# Patient Record
Sex: MALE | Race: WHITE | NOT HISPANIC OR LATINO | Employment: UNEMPLOYED | URBAN - METROPOLITAN AREA
[De-identification: names, ages, dates, MRNs, and addresses within clinical notes are randomized per-mention and may not be internally consistent; named-entity substitution may affect disease eponyms.]

---

## 2019-09-27 ENCOUNTER — TRANSFERRED RECORDS (OUTPATIENT)
Dept: HEALTH INFORMATION MANAGEMENT | Facility: CLINIC | Age: 44
End: 2019-09-27

## 2019-10-25 ENCOUNTER — TELEPHONE (OUTPATIENT)
Dept: FAMILY MEDICINE | Facility: CLINIC | Age: 44
End: 2019-10-25

## 2019-10-25 ENCOUNTER — OFFICE VISIT (OUTPATIENT)
Dept: FAMILY MEDICINE | Facility: CLINIC | Age: 44
End: 2019-10-25
Payer: MEDICAID

## 2019-10-25 ENCOUNTER — ANCILLARY PROCEDURE (OUTPATIENT)
Dept: GENERAL RADIOLOGY | Facility: CLINIC | Age: 44
End: 2019-10-25
Attending: FAMILY MEDICINE
Payer: MEDICAID

## 2019-10-25 VITALS
WEIGHT: 157.3 LBS | RESPIRATION RATE: 16 BRPM | SYSTOLIC BLOOD PRESSURE: 120 MMHG | TEMPERATURE: 99.2 F | HEART RATE: 78 BPM | OXYGEN SATURATION: 97 % | DIASTOLIC BLOOD PRESSURE: 80 MMHG | BODY MASS INDEX: 24.69 KG/M2 | HEIGHT: 67 IN

## 2019-10-25 DIAGNOSIS — M43.16 SPONDYLOLISTHESIS OF LUMBAR REGION: Primary | ICD-10-CM

## 2019-10-25 DIAGNOSIS — M43.16 SPONDYLOLISTHESIS OF LUMBAR REGION: ICD-10-CM

## 2019-10-25 PROCEDURE — 36415 COLL VENOUS BLD VENIPUNCTURE: CPT | Performed by: FAMILY MEDICINE

## 2019-10-25 PROCEDURE — 72100 X-RAY EXAM L-S SPINE 2/3 VWS: CPT

## 2019-10-25 PROCEDURE — 99203 OFFICE O/P NEW LOW 30 MIN: CPT | Performed by: FAMILY MEDICINE

## 2019-10-25 PROCEDURE — 80048 BASIC METABOLIC PNL TOTAL CA: CPT | Performed by: FAMILY MEDICINE

## 2019-10-25 RX ORDER — IBUPROFEN 200 MG
400 TABLET ORAL
COMMUNITY
End: 2019-10-28 | Stop reason: ALTCHOICE

## 2019-10-25 RX ORDER — NAPROXEN 500 MG/1
500 TABLET ORAL 2 TIMES DAILY WITH MEALS
Qty: 60 TABLET | Refills: 0 | Status: SHIPPED | OUTPATIENT
Start: 2019-10-25 | End: 2019-12-06

## 2019-10-25 ASSESSMENT — ENCOUNTER SYMPTOMS
BACK PAIN: 1
DIFFICULTY URINATING: 0
JOINT SWELLING: 0
UNEXPECTED WEIGHT CHANGE: 0
NUMBNESS: 1
FEVER: 0

## 2019-10-25 ASSESSMENT — MIFFLIN-ST. JEOR: SCORE: 1561.1

## 2019-10-25 NOTE — TELEPHONE ENCOUNTER
Form was received today during office visit with Dr. Joseph. Form is placed in Dr. Joseph's folder at his station to review and sign. Once form is completed please fax to [phone].  Day Mcintyre

## 2019-10-25 NOTE — LETTER
October 28, 2019      Luis Eduardo Echevarria  [address] [place] [address]        Dear ,    We are writing to inform you of your test results.  Your test results fall within the expected range(s) or remain unchanged from previous results.  Please continue with current treatment plan.  Resulted Orders   Basic metabolic panel  (Ca, Cl, CO2, Creat, Gluc, K, Na, BUN)   Result Value Ref Range    Sodium 138 133 - 144 mmol/L    Potassium 4.3 3.4 - 5.3 mmol/L    Chloride 106 94 - 109 mmol/L    Carbon Dioxide 27 20 - 32 mmol/L    Anion Gap 5 3 - 14 mmol/L    Glucose 80 70 - 99 mg/dL    Urea Nitrogen 11 7 - 30 mg/dL    Creatinine 0.90 0.66 - 1.25 mg/dL    GFR Estimate >90 >60 mL/min/[1.73_m2]      Comment:      Non  GFR Calc  Starting 12/18/2018, serum creatinine based estimated GFR (eGFR) will be   calculated using the Chronic Kidney Disease Epidemiology Collaboration   (CKD-EPI) equation.      GFR Estimate If Black >90 >60 mL/min/[1.73_m2]      Comment:       GFR Calc  Starting 12/18/2018, serum creatinine based estimated GFR (eGFR) will be   calculated using the Chronic Kidney Disease Epidemiology Collaboration   (CKD-EPI) equation.      Calcium 8.5 8.5 - 10.1 mg/dL   If you have any questions or concerns, please call the clinic at the number listed above.     Sincerely,    Nadeem Joseph MD

## 2019-10-25 NOTE — PROGRESS NOTES
"Subjective     Luis Eduardo Echevarria is a 44 year old male who presents to clinic today for the following health issues:    HPI   44-year-old male presents to clinic to establish care and for follow-up of low back pain.  Initially seen at a [facility] where he had subsequent MRI testing done of his lumbar spine.  Reports he has had chronic back pain for years however has been worse in the last few months.  Pain is sharp, stabbing and shoots down his left leg.  Aggravated by movement and position with no relieving factors.  Refractory to over-the-counter ibuprofen.  Has not had physical therapy.  No incontinence of bowel or bladder. No groin numbness.  Ambulates independently without assistance.  No known injury.  No longer able to work.  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  No additional concerns today.    Reviewed and updated as needed this visit by Provider         Review of Systems   Constitutional: Negative for fever and unexpected weight change.   Genitourinary: Negative for difficulty urinating.   Musculoskeletal: Positive for back pain. Negative for joint swelling.   Skin: Negative for rash.   Neurological: Positive for numbness.     Past Medical History:   Diagnosis Date     Chronic low back pain        Past Surgical History:   Procedure Laterality Date     no surgeries         Family History   Problem Relation Age of Onset     No Known Problems Mother      No Known Problems Father        Social History     Tobacco Use     Smoking status: Current Every Day Smoker     Packs/day: 0.00     Smokeless tobacco: Former User   Substance Use Topics     Alcohol use: Yes            Objective    /80 (BP Location: Right arm, Patient Position: Chair, Cuff Size: Adult Regular)   Pulse 78   Temp 99.2  F (37.3  C) (Oral)   Resp 16   Ht 1.708 m (5' 7.25\")   Wt 71.4 kg (157 lb 4.8 oz)   SpO2 97%   BMI 24.45 kg/m    Body mass index is 24.45 kg/m .  Physical Exam  Neurological:      Gait: Gait " (Normal ambulation, able to ambulate on just forefoot and just heel.) normal.      Deep Tendon Reflexes: Reflexes normal.      Comments: No reported tenderness with straight leg raise.  Forward flexion normal, right ordered and left leg rotation normal.  Decreased range of motion with back bend.          [facility] [id number] - 09/27/2019 9:23 AM CDT    EXAM: MR LUMBAR SPINE WITHOUT IV CONTRAST    COMPARISON: None    FINDINGS:     T12-L1: There is no disc herniation, any significant spinal stenosis or neural  foraminal stenosis.  L1-2: There is no disc herniation, any significant spinal stenosis or neural  foraminal stenosis.  L2-3: There is no disc herniation, any significant spinal stenosis or neural  foraminal stenosis.  L3-4: There is no disc herniation, any significant spinal stenosis or neural  foraminal stenosis.  L4-5: There is central disc bulging producing impression upon the anterior  thecal sac, but no significant spinal stenosis or neural foraminal stenosis.  L5-S1: There is a grade 1 anterolisthesis of L5 on S1, which combines with  diffuse posterior disc bulging, hypertrophic degenerative changes of the facet  joints and mild hypertrophy ligamentum flavum to produce mild spinal stenosis  and bilateral neural foraminal stenosis.    Alignment: Again, grade 1 anterolisthesis of L5 on S1.  Bone Marrow: Normal  Conus: Normal termination  Extra-spinal Findings: No significant incidental findings    For the purpose of this report, 5 lumbar type vertebral bodies are assumed.   Close radiographic correlation recommended prior to any spinal intervention or  surgery.    IMPRESSION:    1. Grade 1 anterolisthesis of L5 on S1, which combines with hypertrophy of the  ligamentum flavum and hypertrophic degenerative changes of the facet joints to  produce mild spinal stenosis and bilateral neural foraminal stenosis.        LUMBAR SPINE TWO - THREE VIEWS   10/25/2019 2:16 PM      HISTORY:  Spondylolisthesis of lumbar region.     COMPARISON: None.     FINDINGS: Alignment is significant for trace dextroconvex curvature of  the thoracolumbar junction and trace levoconvex curvature of the lower  lumbar spine. In addition, there is grade 3 anterolisthesis of L5 on  S1 measuring approximately 2.1 cm. There is associated severe disc  height loss. Pars defects are suspected at L5, but not discretely  visualized.                                                                      IMPRESSION: Grade 3 anterolisthesis of L5 on S1. Recommend spine  surgery consult.        Assessment & Plan     1. Spondylolisthesis of lumbar region  New problem.  Had MRI of lumbar spine performed at an outside facility 1 month ago that showed grade 1 anterolisthesis on L5 and S1.  Repeat lumbar x-ray today which showed worsening to grade 3 anterolisthesis.  Patient is symptomatic however has a nonfocal neurological exam and is ambulating without assistance.  Will start pain control with naproxen and start physical therapy.  Check BMP to ensure kidney function is normal.  Close interval follow-up in 2 weeks.  Advised patient to call and follow-up if symptoms progressively worsens and we will refer him to a spine surgeon for lumbar fusion.  - XR Lumbar Spine 2/3 Views; Future  - naproxen (NAPROSYN) 500 MG tablet; Take 1 tablet (500 mg) by mouth 2 times daily (with meals)  Dispense: 60 tablet; Refill: 0  - Basic metabolic panel  (Ca, Cl, CO2, Creat, Gluc, K, Na, BUN)  - [facility] REFERRAL; Future       Return in about 2 weeks (around 11/8/2019) for Physical Exam.    Nadeem Joseph MD  [facility]    Documentation was prepared using Dragon voice recognition software. Please excuse typographical errors. Please contact me if documentation is unclear.

## 2019-10-26 LAB
ANION GAP SERPL CALCULATED.3IONS-SCNC: 5 MMOL/L (ref 3–14)
BUN SERPL-MCNC: 11 MG/DL (ref 7–30)
CALCIUM SERPL-MCNC: 8.5 MG/DL (ref 8.5–10.1)
CHLORIDE SERPL-SCNC: 106 MMOL/L (ref 94–109)
CO2 SERPL-SCNC: 27 MMOL/L (ref 20–32)
CREAT SERPL-MCNC: 0.9 MG/DL (ref 0.66–1.25)
GFR SERPL CREATININE-BSD FRML MDRD: >90 ML/MIN/{1.73_M2}
GLUCOSE SERPL-MCNC: 80 MG/DL (ref 70–99)
POTASSIUM SERPL-SCNC: 4.3 MMOL/L (ref 3.4–5.3)
SODIUM SERPL-SCNC: 138 MMOL/L (ref 133–144)

## 2019-10-28 ENCOUNTER — TELEPHONE (OUTPATIENT)
Dept: FAMILY MEDICINE | Facility: CLINIC | Age: 44
End: 2019-10-28

## 2019-10-28 NOTE — TELEPHONE ENCOUNTER
Called to inform Xray Lumbar result, no answer, msg left to call me back.     Called again.  Patient states that he has not started naproxen and had to leave due to a family emergency.  His former wife sister is dying.  Still ambulating without difficulty however still having significant back pain.  Voiced great appreciation after informing him that the x-ray lumbar results showed that his final listhesis has worsened.  Agrees and follows up he agrees to follow-up with me in 2 weeks at his physical exam to report his symptom control and voiced understanding that we may send him to a spine surgeon.  Otherwise, he has no new concerns.

## 2019-10-28 NOTE — TELEPHONE ENCOUNTER
Form completed and faxed to [phone]. Sent a copy to [facility] and sent for to [facility]. Day Mcintyre

## 2019-11-07 ENCOUNTER — THERAPY VISIT (OUTPATIENT)
Dept: PHYSICAL THERAPY | Facility: CLINIC | Age: 44
End: 2019-11-07
Attending: FAMILY MEDICINE
Payer: COMMERCIAL

## 2019-11-07 DIAGNOSIS — M54.50 LOW BACK PAIN: ICD-10-CM

## 2019-11-07 DIAGNOSIS — M43.16 SPONDYLOLISTHESIS OF LUMBAR REGION: ICD-10-CM

## 2019-11-07 PROCEDURE — 97110 THERAPEUTIC EXERCISES: CPT | Mod: GP | Performed by: PHYSICAL THERAPIST

## 2019-11-07 PROCEDURE — 97161 PT EVAL LOW COMPLEX 20 MIN: CPT | Mod: GP | Performed by: PHYSICAL THERAPIST

## 2019-11-07 NOTE — PROGRESS NOTES
"[facility] Initial Evaluation  Subjective:  The history is provided by the patient. No  was used.   Type of problem:  Lumbar   Condition occurred with:  Insidious onset.    Problem details: Patient reports having back pain for \"years\".  Patient awoke on 8/19/2019 with severe left low back pain radiating down into the left foot along with numbness/tingling.  Patient c/o pain with lifting, sitting, and standing.  Patient more comfortable squatting.  Physical therapy was ordered 10/25/2019.  Patient born with mild spina bifida.   MRI indicates grade 1 anterolisthesis.  X-Ray indicates grade 3 anterolisthesis.  Patient apprehensive about physical therapy.   Patient reports pain:  Central lumbar spine and lumbar spine left. Radiates to:  Gluteals left, thigh left, lower leg left and foot left. Associated symptoms:  Numbness, tingling and loss of motion/stiffness. Symptoms are exacerbated by sitting, standing, walking and lifting and relieved by NSAID's and other (squatting).    Where condition occurred: for unknown reasons.  and reported as 3/10 on pain scale. General health as reported by patient is good. Pertinent medical history includes:  Smoking.  Medical allergies: none.  Surgeries include:  None.  Current medications:  Anti-inflammatory.     Pain quality: \"nerve pain\" and is constant. Pain is worse in the A.M.. Since onset symptoms are gradually improving. Special tests:  MRI and x-ray. Past treatment: none.    Patient is . Restrictions include:  Currently not working due to present treatment.    Barriers include:  None as reported by patient.  Red flags:  None as reported by patient.                      Objective:  Standing Alignment:        Lumbar:  Lordosis decr            Gait:      Deviations:  Lumbar:  Trunk flexionGeneral Deviations:  Katlin decr               Lumbar/SI Evaluation  ROM:    AROM Lumbar:   Flexion:          WNL  Ext:                    Major " Loss - Severe apprehension   Side Bend:        Left:     Right:   Rotation:           Left:     Right:   Side Glide:        Left:  Mod Loss    Right:  Mod Loss        Strength: Fair core strength  Lumbar Myotomes:  normal                  Neural Tension/Mobility:      Left side:SLR  negative.     Right side:   SLR  negative.                                                        General     ROS    Assessment/Plan:    Patient is a 44 year old male with lumbar complaints.    Patient has the following significant findings with corresponding treatment plan.                Diagnosis 1:  Acute on Chronic LBP - Anterolisthesis  Pain -  self management, education and home program  Decreased ROM/flexibility - therapeutic exercise, therapeutic activity and home program  Decreased strength - therapeutic exercise, therapeutic activities and home program  Impaired gait - gait training and home program  Impaired muscle performance - neuro re-education and home program  Decreased function - therapeutic activities and home program  Impaired posture - neuro re-education, therapeutic activities and home program    Therapy Evaluation Codes:   1) History comprised of:   Personal factors that impact the plan of care:      None.    Comorbidity factors that impact the plan of care are:      None.     Medications impacting care: Anti-inflammatory.  2) Examination of Body Systems comprised of:   Body structures and functions that impact the plan of care:      Lumbar spine.   Activity limitations that impact the plan of care are:      Sitting, Standing, Walking and Working.  3) Clinical presentation characteristics are:   Stable/Uncomplicated.  4) Decision-Making    Low complexity using standardized patient assessment instrument and/or measureable assessment of functional outcome.  Cumulative Therapy Evaluation is: Low complexity.    Previous and current functional limitations:  (See Goal Flow Sheet for this information)    Short term and  Long term goals: (See Goal Flow Sheet for this information)     Communication ability:  Patient appears to be able to clearly communicate and understand verbal and written communication and follow directions correctly.  Treatment Explanation - The following has been discussed with the patient:   RX ordered/plan of care  Anticipated outcomes  Possible risks and side effects  This patient would benefit from PT intervention to resume normal activities.   Rehab potential is good.    Frequency:  1 X week, once daily  Duration:  for 8 weeks  Discharge Plan:  Achieve all LTG.  Independent in home treatment program.  Reach maximal therapeutic benefit.    Please refer to the daily flowsheet for treatment today, total treatment time and time spent performing 1:1 timed codes.

## 2019-11-08 ENCOUNTER — OFFICE VISIT (OUTPATIENT)
Dept: FAMILY MEDICINE | Facility: CLINIC | Age: 44
End: 2019-11-08
Payer: COMMERCIAL

## 2019-11-08 VITALS
SYSTOLIC BLOOD PRESSURE: 110 MMHG | TEMPERATURE: 98.8 F | WEIGHT: 151.7 LBS | BODY MASS INDEX: 23.81 KG/M2 | HEART RATE: 74 BPM | RESPIRATION RATE: 16 BRPM | DIASTOLIC BLOOD PRESSURE: 80 MMHG | OXYGEN SATURATION: 99 % | HEIGHT: 67 IN

## 2019-11-08 DIAGNOSIS — M43.16 SPONDYLOLISTHESIS OF LUMBAR REGION: Primary | ICD-10-CM

## 2019-11-08 DIAGNOSIS — Z59.9 FINANCIAL DIFFICULTY: ICD-10-CM

## 2019-11-08 PROCEDURE — 99214 OFFICE O/P EST MOD 30 MIN: CPT | Performed by: FAMILY MEDICINE

## 2019-11-08 SDOH — ECONOMIC STABILITY - INCOME SECURITY: PROBLEM RELATED TO HOUSING AND ECONOMIC CIRCUMSTANCES, UNSPECIFIED: Z59.9

## 2019-11-08 ASSESSMENT — ENCOUNTER SYMPTOMS
BACK PAIN: 1
WEAKNESS: 1
DIFFICULTY URINATING: 0
NUMBNESS: 1

## 2019-11-08 ASSESSMENT — MIFFLIN-ST. JEOR: SCORE: 1535.7

## 2019-11-08 NOTE — LETTER
[facility]  [id number] [place] [id number]  Phone: [phone]    11/08/19    Luis Eduardo Echevarria  [address] [facility] [address]      To whom it may concern:     Mr. Echevarria is not able to perform any physical work until further notification due to medical illness. Please call us with any questions.     Sincerely,      Nadeem Joseph MD

## 2019-11-08 NOTE — PROGRESS NOTES
"Subjective     Luis Eduardo Echevarria is a 44 year old male who presents to clinic today for the following health issues:    HPI   Patient is a pleasant 44-year-old male who recently establish care with me who presents for follow-up of low back pain.  Initially seen 2 weeks ago by me and brought in outpatient MRI testing that showed grade 1 L5 on S1 anterolisthesis.  Repeat x-ray showed grade 3.  Time previous visit was referred to at previous visit was referred to physical therapy and started naproxen for pain control.    Continues to have severe low back pain with numbness and tingling radiating down to the left leg.  Had his first physical therapy encounter yesterday.  Naproxen minimally improves pain.  No numbness in the groin, no incontinence of bowel or bladder.     reviewed and updated as needed this visit by Provider         Review of Systems   Genitourinary: Negative for difficulty urinating.   Musculoskeletal: Positive for back pain and gait problem.   Neurological: Positive for weakness and numbness.      Past Medical History:   Diagnosis Date     Spondylolisthesis at L5-S1 level     grade 3           Objective    /80 (BP Location: Right arm, Patient Position: Chair, Cuff Size: Adult Regular)   Pulse 74   Temp 98.8  F (37.1  C) (Oral)   Resp 16   Ht 1.708 m (5' 7.25\")   Wt 68.8 kg (151 lb 11.2 oz)   SpO2 99%   BMI 23.58 kg/m    Body mass index is 23.58 kg/m .  Physical Exam  Vitals signs reviewed.   Constitutional:       Appearance: He is not ill-appearing.   Cardiovascular:      Rate and Rhythm: Normal rate.   Pulmonary:      Effort: No respiratory distress.   Neurological:      Mental Status: He is alert.      Comments: Antalgic gait        10/25/2019  IMPRESSION: Grade 3 anterolisthesis of L5 on S1. Recommend spine  surgery consult.        Assessment & Plan     1. Spondylolisthesis of lumbar region  New problem.  Grade 3 L5 on S1 anterolisthesis.  Continue outpatient physical therapy, consult " spine surgery.  Continue naproxen pain control, would not recommend escalating to narcotics right now as it may be more difficult to control his pain postoperatively.  Follow-up in 1 month for reevaluation.  - SPINE SURGERY REFERRAL    2. Financial difficulty  - CARE COORDINATION REFERRAL      Return in about 1 month (around 12/8/2019) for lumbar back pain pre op.    Nadeem Joseph MD  [facility]    Documentation was prepared using Dragon voice recognition software. Please excuse typographical errors. Please contact me if documentation is unclear.

## 2019-11-11 ENCOUNTER — OFFICE VISIT (OUTPATIENT)
Dept: NEUROSURGERY | Facility: CLINIC | Age: 44
End: 2019-11-11
Attending: FAMILY MEDICINE
Payer: COMMERCIAL

## 2019-11-11 VITALS
SYSTOLIC BLOOD PRESSURE: 139 MMHG | TEMPERATURE: 98.4 F | HEART RATE: 85 BPM | OXYGEN SATURATION: 99 % | DIASTOLIC BLOOD PRESSURE: 82 MMHG | HEIGHT: 67 IN | BODY MASS INDEX: 24.17 KG/M2 | WEIGHT: 154 LBS

## 2019-11-11 DIAGNOSIS — M43.16 SPONDYLOLISTHESIS OF LUMBAR REGION: ICD-10-CM

## 2019-11-11 PROCEDURE — G0463 HOSPITAL OUTPT CLINIC VISIT: HCPCS

## 2019-11-11 PROCEDURE — 99205 OFFICE O/P NEW HI 60 MIN: CPT | Performed by: PHYSICIAN ASSISTANT

## 2019-11-11 ASSESSMENT — PAIN SCALES - GENERAL: PAINLEVEL: MILD PAIN (3)

## 2019-11-11 ASSESSMENT — MIFFLIN-ST. JEOR: SCORE: 1542.17

## 2019-11-11 NOTE — LETTER
"    11/11/2019         RE: Luis Eduardo Echevarria  [address] [facility] [address]        Dear Colleague,    Thank you for referring your patient, Luis Eduardo Echevarria, to the [facility]. Please see a copy of my visit note below.    Neurosurgery consult    Mr. Echevarria is a 44-year-old male referred to us by his primary care provider for evaluation of low back pain and left leg radicular symptoms.  The patient states he has had low back pain for many years and more recently over the past few months has developed left leg pain and tingling.  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  He denies any right leg symptoms he reports he did have a back injury as a young person.  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  Overall the symptoms have been present 6 or 7 years.    He denies bowel or bladder issues or saddle anesthesia denies weakness in his right leg.    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  Non-smoker.      /82 (BP Location: Left arm, Patient Position: Sitting, Cuff Size: Adult Large)   Pulse 85   Temp 98.4  F (36.9  C) (Oral)   Ht 5' 7\" (1.702 m)   Wt 154 lb (69.9 kg)   SpO2 99%   BMI 24.12 kg/m         Exam    Alert and oriented in no acute distress  Bilateral lower extremities with 5 out of 5 strength including dorsiflexion and plantarflexion and extensor hallucis longus  Reflexes 2+ patella and ankle, negative ankle clonus negative Babinski  Positive straight leg raise on the left negative on the right  Lumbar spine nontender to palpation  Gait is normal    Imaging    Lumbar MRI demonstrates grade 1/2 anterolisthesis of L5 on S1 with with bilateral pars defects.  Severe bilateral neuroforaminal stenosis.    Assessment    Low back pain  Left leg " radiculopathy  Anterolisthesis at L5-S1  Spondylolisthesis    Plan:    We will send the patient for trial of lumbar traction and have a home unit issued if appropriate.  He will follow-up in 4 weeks with Dr. Eason, to discuss his symptoms and whether he might want to proceed with lumbar fusion.  Dr. Chand met with the patient today and indicated that this is not a fusion operation that he would perform due to the need for an open approach, therefore we have referred him to Dr. Eason.    Total time was 60 minutes spent in consultation MD Mannie met with the patient and reviewed the above is and is in agreement with the plan    Again, thank you for allowing me to participate in the care of your patient.        Sincerely,        Sanya Mathis PA-C

## 2019-11-11 NOTE — NURSING NOTE
"Luis Eduardo Echevarria is a 44 year old male who presents for:  Chief Complaint   Patient presents with     Consult For     spondylolisthesis of the lumbar region.         Initial Vitals:  /82 (BP Location: Left arm, Patient Position: Sitting, Cuff Size: Adult Large)   Pulse 85   Temp 98.4  F (36.9  C) (Oral)   Ht 5' 7\" (1.702 m)   Wt 154 lb (69.9 kg)   SpO2 99%   BMI 24.12 kg/m   Estimated body mass index is 24.12 kg/m  as calculated from the following:    Height as of this encounter: 5' 7\" (1.702 m).    Weight as of this encounter: 154 lb (69.9 kg).. Body surface area is 1.82 meters squared. BP completed using cuff size: large  Mild Pain (3)        Nursing Comments: Patient states that he has low back pain and left sided leg pain. He also states that he has occasional tingling and numbness in the left foot.         Lashonda Woodruff, [facility]      "

## 2019-11-11 NOTE — PATIENT INSTRUCTIONS
1. Referral for physical therapy.   2. Follow up with Dr. Eason on 12/9/19.       Please call our clinic with any questions or concerns: [phone]

## 2019-11-11 NOTE — PROGRESS NOTES
"Neurosurgery consult    Mr. Echevarria is a 44-year-old male referred to us by his primary care provider for evaluation of low back pain and left leg radicular symptoms.  The patient states he has had low back pain for many years and more recently over the past few months has developed left leg pain and tingling.  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  He denies any right leg symptoms he reports he did have a back injury as a young person.  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  Overall the symptoms have been present 6 or 7 years.    He denies bowel or bladder issues or saddle anesthesia denies weakness in his right leg.    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  Non-smoker.      /82 (BP Location: Left arm, Patient Position: Sitting, Cuff Size: Adult Large)   Pulse 85   Temp 98.4  F (36.9  C) (Oral)   Ht 5' 7\" (1.702 m)   Wt 154 lb (69.9 kg)   SpO2 99%   BMI 24.12 kg/m        Exam    Alert and oriented in no acute distress  Bilateral lower extremities with 5 out of 5 strength including dorsiflexion and plantarflexion and extensor hallucis longus  Reflexes 2+ patella and ankle, negative ankle clonus negative Babinski  Positive straight leg raise on the left negative on the right  Lumbar spine nontender to palpation  Gait is normal    Imaging    Lumbar MRI demonstrates grade 1/2 anterolisthesis of L5 on S1 with with bilateral pars defects.  Severe bilateral neuroforaminal stenosis.    Assessment    Low back pain  Left leg radiculopathy  Anterolisthesis at L5-S1  Spondylolisthesis    Plan:    We will send the patient for trial of lumbar traction and have a home unit issued if appropriate.  He will follow-up in 4 weeks with Dr. Eason, to discuss his symptoms and whether he might want to proceed " with lumbar fusion.  Dr. Chand met with the patient today and indicated that this is not a fusion operation that he would perform due to the need for an open approach, therefore we have referred him to Dr. Eason.    Total time was 60 minutes spent in consultation MD Mannie met with the patient and reviewed the above is and is in agreement with the plan

## 2019-11-12 ENCOUNTER — PATIENT OUTREACH (OUTPATIENT)
Dept: CARE COORDINATION | Facility: CLINIC | Age: 44
End: 2019-11-12

## 2019-11-12 NOTE — PROGRESS NOTES
Clinic Care Coordination Contact  [facility]/Voicemail    Referral Source: PCP  Clinical Data: Care Coordinator Outreach  Outreach attempted x 1.  Left message on patient's voicemail with call back information and requested return call.  Plan: Care Coordinator will send care coordination introduction letter with care coordinator contact information and explanation of care coordination services via mail. Care Coordinator will try to reach patient again in 1-2 business days.      FELY Nesbitt   Care Coordination Team  [phone]

## 2019-11-13 ENCOUNTER — THERAPY VISIT (OUTPATIENT)
Dept: PHYSICAL THERAPY | Facility: CLINIC | Age: 44
End: 2019-11-13
Payer: COMMERCIAL

## 2019-11-13 DIAGNOSIS — M54.50 LOW BACK PAIN: ICD-10-CM

## 2019-11-13 PROCEDURE — 97112 NEUROMUSCULAR REEDUCATION: CPT | Mod: GP | Performed by: PHYSICAL THERAPIST

## 2019-11-13 PROCEDURE — 97110 THERAPEUTIC EXERCISES: CPT | Mod: GP | Performed by: PHYSICAL THERAPIST

## 2019-11-25 ENCOUNTER — THERAPY VISIT (OUTPATIENT)
Dept: PHYSICAL THERAPY | Facility: CLINIC | Age: 44
End: 2019-11-25
Payer: COMMERCIAL

## 2019-11-25 DIAGNOSIS — M54.50 LOW BACK PAIN: ICD-10-CM

## 2019-11-25 PROCEDURE — 97110 THERAPEUTIC EXERCISES: CPT | Mod: GP | Performed by: PHYSICAL THERAPIST

## 2019-11-25 PROCEDURE — 97012 MECHANICAL TRACTION THERAPY: CPT | Mod: GP | Performed by: PHYSICAL THERAPIST

## 2019-12-04 ENCOUNTER — THERAPY VISIT (OUTPATIENT)
Dept: PHYSICAL THERAPY | Facility: CLINIC | Age: 44
End: 2019-12-04
Payer: COMMERCIAL

## 2019-12-04 DIAGNOSIS — M54.50 LOW BACK PAIN: ICD-10-CM

## 2019-12-04 PROCEDURE — 97110 THERAPEUTIC EXERCISES: CPT | Mod: GP | Performed by: PHYSICAL THERAPIST

## 2019-12-04 PROCEDURE — 97012 MECHANICAL TRACTION THERAPY: CPT | Mod: GP | Performed by: PHYSICAL THERAPIST

## 2019-12-06 ENCOUNTER — THERAPY VISIT (OUTPATIENT)
Dept: PHYSICAL THERAPY | Facility: CLINIC | Age: 44
End: 2019-12-06
Payer: COMMERCIAL

## 2019-12-06 ENCOUNTER — OFFICE VISIT (OUTPATIENT)
Dept: FAMILY MEDICINE | Facility: CLINIC | Age: 44
End: 2019-12-06
Payer: COMMERCIAL

## 2019-12-06 VITALS
TEMPERATURE: 98.2 F | DIASTOLIC BLOOD PRESSURE: 86 MMHG | SYSTOLIC BLOOD PRESSURE: 120 MMHG | OXYGEN SATURATION: 99 % | BODY MASS INDEX: 24.34 KG/M2 | HEART RATE: 80 BPM | WEIGHT: 155.1 LBS | RESPIRATION RATE: 16 BRPM | HEIGHT: 67 IN

## 2019-12-06 DIAGNOSIS — M43.16 SPONDYLOLISTHESIS OF LUMBAR REGION: ICD-10-CM

## 2019-12-06 DIAGNOSIS — M54.50 LOW BACK PAIN: ICD-10-CM

## 2019-12-06 DIAGNOSIS — M79.2 NEUROPATHIC PAIN: Primary | ICD-10-CM

## 2019-12-06 PROCEDURE — 97012 MECHANICAL TRACTION THERAPY: CPT | Mod: GP

## 2019-12-06 PROCEDURE — 97110 THERAPEUTIC EXERCISES: CPT | Mod: GP

## 2019-12-06 PROCEDURE — 99213 OFFICE O/P EST LOW 20 MIN: CPT | Performed by: FAMILY MEDICINE

## 2019-12-06 RX ORDER — PREGABALIN 50 MG/1
50 CAPSULE ORAL DAILY
Qty: 30 CAPSULE | Refills: 1 | Status: SHIPPED | OUTPATIENT
Start: 2019-12-06 | End: 2019-12-20

## 2019-12-06 RX ORDER — NAPROXEN 500 MG/1
500 TABLET ORAL 2 TIMES DAILY WITH MEALS
Qty: 60 TABLET | Refills: 1 | Status: ON HOLD | OUTPATIENT
Start: 2019-12-06 | End: 2020-03-01

## 2019-12-06 ASSESSMENT — MIFFLIN-ST. JEOR: SCORE: 1547.16

## 2019-12-06 NOTE — PROGRESS NOTES
"Subjective     Luis Eduardo Echevarria is a 44 year old male who presents to clinic today for the following health issues:    HPI   Patient is a pleasant 44-year-old male who recently establish with me presents to the clinic for follow-up of grade 3 L5 on S1 anterolisthesis.  At last interval visit, we referred him to spine surgery, continues naproxen and physical therapy for pain control.  Patient reports he finds physical therapy helpful, reports 50% reduction in nerve types of pain in low back, which radiates to his left buttocks and thigh.  However, Recently started doing traction therapy at physical therapy, which she has found to be the most helpful.  This was started after recommendation by spinal surgery.  Overall, he is ambulatory independently however continues to have pain with prolonged physical activity including walking.  No new numbness, weakness or tingling.  Denies any numbness in the groin.  No difficulty or incontinence of bowel or bladder.    Reviewed and updated as needed this visit by Provider         Review of Systems   Musculoskeletal: Positive for arthralgias and back pain.            Objective    /86 (BP Location: Right arm, Patient Position: Chair, Cuff Size: Adult Regular)   Pulse 80   Temp 98.2  F (36.8  C) (Oral)   Resp 16   Ht 1.702 m (5' 7\")   Wt 70.4 kg (155 lb 1.6 oz)   SpO2 99%   BMI 24.29 kg/m    Body mass index is 24.29 kg/m .  Physical Exam  Vitals signs reviewed.   Constitutional:       Appearance: Normal appearance. He is not ill-appearing.   Neurological:      Mental Status: He is alert.      Comments: Antalgic gait.  Deep tendon reflexes bilateral patellar reflexes 2+.  Sensation in the lower extremities globally intact.q   Psychiatric:         Mood and Affect: Mood normal.         Last Comprehensive Metabolic Panel:  Sodium   Date Value Ref Range Status   10/25/2019 138 133 - 144 mmol/L Final     Potassium   Date Value Ref Range Status   10/25/2019 4.3 3.4 - 5.3 mmol/L " Final     Chloride   Date Value Ref Range Status   10/25/2019 106 94 - 109 mmol/L Final     Carbon Dioxide   Date Value Ref Range Status   10/25/2019 27 20 - 32 mmol/L Final     Anion Gap   Date Value Ref Range Status   10/25/2019 5 3 - 14 mmol/L Final     Glucose   Date Value Ref Range Status   10/25/2019 80 70 - 99 mg/dL Final     Urea Nitrogen   Date Value Ref Range Status   10/25/2019 11 7 - 30 mg/dL Final     Creatinine   Date Value Ref Range Status   10/25/2019 0.90 0.66 - 1.25 mg/dL Final     GFR Estimate   Date Value Ref Range Status   10/25/2019 >90 >60 mL/min/[1.73_m2] Final     Comment:     Non  GFR Calc  Starting 12/18/2018, serum creatinine based estimated GFR (eGFR) will be   calculated using the Chronic Kidney Disease Epidemiology Collaboration   (CKD-EPI) equation.       Calcium   Date Value Ref Range Status   10/25/2019 8.5 8.5 - 10.1 mg/dL Final             Assessment & Plan     1. Neuropathic pain  Due to problem #2.  Good interval improvement with physical therapy however symptoms are uncontrolled and continue to limit his activities of daily living.  Will start low-dose Lyrica for symptom control.  Explained this is a controlled substance.  Patient voices understanding this prescription is not to be abused, given to others and he should seek his PCP when possible to have this medication refilled.  Patient does have low risk for abuse as he has no previous history of a substance use disorder.  We will follow-up in 2 weeks via a telephone encounter for interval monitoring of neuropathic pain.  - pregabalin (LYRICA) 50 MG capsule; Take 1 capsule (50 mg) by mouth daily  Dispense: 30 capsule; Refill: 1    2. Spondylolisthesis of lumbar region  Moderate control.  Continue physical therapy, continue naproxen.  Initial BMP reviewed without electrolyte derangements.  Consider interval rechecks of every 3 to 6 months of his metabolic panel to ensure there are no renal dysfunctions with  chronic NSAID use.  Medication refilled.  - naproxen (NAPROSYN) 500 MG tablet; Take 1 tablet (500 mg) by mouth 2 times daily (with meals)  Dispense: 60 tablet; Refill: 1       Return in about 2 weeks (around 12/20/2019) for phone visit for back pain followup. .    Nadeem Joseph MD  [facility]      Documentation was prepared using Dragon voice recognition software. Please excuse typographical errors. Please contact me if documentation is unclear.

## 2019-12-07 ASSESSMENT — ENCOUNTER SYMPTOMS
BACK PAIN: 1
ARTHRALGIAS: 1

## 2019-12-09 ENCOUNTER — HEALTH MAINTENANCE LETTER (OUTPATIENT)
Age: 44
End: 2019-12-09

## 2019-12-09 ENCOUNTER — OFFICE VISIT (OUTPATIENT)
Dept: NEUROSURGERY | Facility: CLINIC | Age: 44
End: 2019-12-09
Attending: NEUROLOGICAL SURGERY
Payer: COMMERCIAL

## 2019-12-09 VITALS
BODY MASS INDEX: 24.33 KG/M2 | DIASTOLIC BLOOD PRESSURE: 88 MMHG | HEART RATE: 86 BPM | RESPIRATION RATE: 16 BRPM | OXYGEN SATURATION: 98 % | WEIGHT: 155 LBS | HEIGHT: 67 IN | TEMPERATURE: 98 F | SYSTOLIC BLOOD PRESSURE: 133 MMHG

## 2019-12-09 DIAGNOSIS — M48.061 SPINAL STENOSIS OF LUMBAR REGION WITH RADICULOPATHY: ICD-10-CM

## 2019-12-09 DIAGNOSIS — M43.10 ISTHMIC SPONDYLOLISTHESIS: Primary | ICD-10-CM

## 2019-12-09 DIAGNOSIS — M54.16 SPINAL STENOSIS OF LUMBAR REGION WITH RADICULOPATHY: ICD-10-CM

## 2019-12-09 PROCEDURE — 99213 OFFICE O/P EST LOW 20 MIN: CPT | Performed by: NEUROLOGICAL SURGERY

## 2019-12-09 ASSESSMENT — MIFFLIN-ST. JEOR: SCORE: 1546.71

## 2019-12-09 ASSESSMENT — PAIN SCALES - GENERAL: PAINLEVEL: MILD PAIN (2)

## 2019-12-09 NOTE — PATIENT INSTRUCTIONS
Patient Instructions  Pre-Operative:  -Surgery scheduled at [facility] for Lumbar 4-Sacral 1 posterior segmental instrumentation. Pelvic Instrumentation. Bilateral Lumbar 4-Sacral 1 transforaminal interbody fusion and Leone Muro osteotomies. Posterior arthrodesis Lumbar 4-Sacral 1  -Surgical risks: blood clots in the leg or lung, problems urinating, nerve damage, drainage from the incision, infection,stiffness  - Pre-operative physical with primary care physician within 30 days of surgical date.   -4-7 night hospitalization.    -Shower procedure: please shower with antimicrobial soap the night before surgery and morning of surgery. Please refer to showering instruction sheet in folder.  -Stop all solid foods 8 hours before surgery.  -Keep drinking clear liquids until 4 hours before surgery. Clear liquids include water, clear juice, black coffee, or clear tea without milk, Gatorade, clear soda.   - Discontinue Aspirin, NSAIDs (Advil/Ibuprofen, Naproxen,Nuprin,Relafen/Nabumetone, Diclofenac,Meloxicam, Aleve, Celebrex) x 7 days prior to surgical date.  - May try tylenol for pain 1000 mg three times per day for pain      Post-Operative:  -Do not begin taking Non-Steroidal Anti-Inflammatory Drugs or NSAIDs (Advil/ibuprofen, Naproxen,Nuprin, Relafen/Nabumetone, Diclofenac,Meloxicam, Aleve, Celebrex, Aspirin, etc.) until 6 weeks after surgery. May cause bleeding and interfere with bone healing.   - Post operative incisional pain x 1-2 weeks which will require pain medications and muscle relaxants. You will receive medication upon discharge.  -Do NOT drive while taking narcotic pain medication.  -Do not drink alcohol while using any pain medication  -Post operative incision care- Watch for signs of infection: redness, swelling, warmth, drainage, and fever of 101 degrees or higher. Notify clinic [phone].  -No submerging incision in water such as pools, hot tubs,baths for at least 8 weeks or until  incision is healed. Showers are fine.   - Post operative activity limitations: 6-8 weeks, no lifting > 10 pounds, no bending, twisting, or overhead reaching.  -Ok to walk as tolerated, avoid bed rest and prolonged sitting.  -No contact sports until after follow up visit  -No high impact activities such as; running/jogging, snowmobile or 4 hinojosa riding or any other recreational vehicles.  - Post op follow up appointments: 2 weeks suture/staple removal and 6 week post op follow up visit with Physician Assistant and x-ray prior to appointment.Please call to schedule follow up appointment at [phone].  -If you are currently employed, you will need to be off work for 4-6 weeks for post op recovery and healing. Please fax any FMLA/short term disability paperwork to [phone]. You may call our clinic when you'd like to return to work and we can provide a work letter.     SMOKING CESSATION  What's in cigarette smoke? - Cigarette smoke contains over 4,000 chemicals. Nicotine is one of the main ingredients which is an insecticide/herbicide. It is poisonous to our nervous system, increases blood clotting risk, and decreases the body's defenses to fight off infection. Another chemical is Carbon Monoxide is an asphyxiating gas that permanently binds to blood cells and blocks the transport of oxygen. This leads to tissue death and decreases your metabolism. Tar is a chemical that coats your lungs and trachea which impairs new oxygen coming in and carbon dioxide getting out of your body.   How does smoking impact surgery? - Smoking is particularly hazardous with regards to surgery. Surgery puts stress on the body and a smoker's body is already under strain from these chemicals. Putting the two together, especially for an elective surgery, could be a recipe for disaster. Smoking before and after surgery increases your risk of heart problems, slow wound healing, delayed bone healing, blood clots, wound infection and  anesthesia complications.   What are the benefits of quitting? - In 20 minutes your blood pressure will drop back down to normal. In 8 hours the carbon monoxide (a toxic gas) levels in your blood stream will drop by half, and oxygen levels will return to normal. In 48 hours your chance of having a heart attack will have decreased. All nicotine will have left your body. Your sense of taste and smell will return to a normal level. In 72 hours your bronchial tubes will relax, and your energy levels will increase. In 2 weeks your circulation will increase, and it will continue to improve for the next 10 weeks.    Recommendations for elective surgery - Ideally, patients should quit smoking 8 weeks before and at least 2 weeks after elective surgery in order to avoid complications. Simply cutting back on the amount of cigarettes smoked per day does not offer any benefit or decrease the risk of poor wound healing, heart problems, and infection. Smokers should also start taking Vitamin C and B for two weeks before surgery and two weeks after surgery.    Ways to Stop Smokin. Nicotine patches, lozenges, or gum  2. Support Groups  3. Medications (see below)    List of Medications:  1. Varenicline Tartrate (CHANTIX)   2. Bupropion HCL (WELLBUTRIN, ZYBAN) - note: make sure Wellbutrin is for smoking cessation and not other issues   3. Nicotine Patch (NICODERM)   4. Nicotine Inhaler (NICOTROL)   5. Nicotine Gum Nicotine Polacrilex   6. Nicotine Lozenge: Nicotine Polacrilex (COMMIT)   * [employer] offers a smoking support group as well!  Please visit: [email or web address]  If you are interested in these, ask about getting a prescription or talk to your primary care doctor about what may be the best way for you to quit.

## 2019-12-09 NOTE — PROGRESS NOTES
It was a pleasure to see Luis Eduardo Echevarria today in Neurosurgery Clinic. He is a 44 year old male who was seen by Sanya Mathis PA-C on November 11. Please see his note for full details of his symptoms.  He has been doing physical therapy with traction which he thinks is helped somewhat but he continues to have symptoms of back and left leg pain.    Past Medical History:   Diagnosis Date     Spondylolisthesis at L5-S1 level     grade 3     Past Surgical History:   Procedure Laterality Date     no surgeries        No Known Allergies    Current Outpatient Medications:      naproxen (NAPROSYN) 500 MG tablet, Take 1 tablet (500 mg) by mouth 2 times daily (with meals), Disp: 60 tablet, Rfl: 1     pregabalin (LYRICA) 50 MG capsule, Take 1 capsule (50 mg) by mouth daily, Disp: 30 capsule, Rfl: 1  Social History     Socioeconomic History     Marital status:      Spouse name: None     Number of children: None     Years of education: None     Highest education level: None   Occupational History     None   Social Needs     Financial resource strain: None     Food insecurity:     Worry: None     Inability: None     Transportation needs:     Medical: None     Non-medical: None   Tobacco Use     Smoking status: Current Every Day Smoker     Packs/day: 0.00     Smokeless tobacco: Former User   Substance and Sexual Activity     Alcohol use: Yes     Drug use: Never     Sexual activity: Yes     Partners: Female   Lifestyle     Physical activity:     Days per week: None     Minutes per session: None     Stress: None   Relationships     Social connections:     Talks on phone: None     Gets together: None     Attends Catholic service: None     Active member of club or organization: None     Attends meetings of clubs or organizations: None     Relationship status: None     Intimate partner violence:     Fear of current or ex partner: None     Emotionally abused: None     Physically abused: None     Forced sexual activity: None  "  Other Topics Concern     None   Social History Narrative     None      Problem (# of Occurrences) Relation (Name,Age of Onset)    No Known Problems (2) Mother, Father           ROS: 10 point ROS neg other than the symptoms noted above in the HPI.    Vitals:    12/09/19 1333   BP: 133/88   Pulse: 86   Resp: 16   Temp: 98  F (36.7  C)   TempSrc: Oral   SpO2: 98%   Weight: 70.3 kg (155 lb)   Height: 1.702 m (5' 7\")     Body mass index is 24.28 kg/m .  Mild Pain (2)    Oswestry (NICKOLAS) Questionnaire    OSWESTRY DISABILITY INDEX 12/9/2019   Count 10   Sum 16   Oswestry Score (%) 32   Some recent data might be hidden       Visual Analog Scale (VAS) Questionnaire    No flowsheet data found.       Awake alert and oriented.  Strength 5 out of 5 bilateral lower extremities.    Imaging: MRI and x-rays of the lumbar spine demonstrate grade 2 spondylolisthesis at L5-S1.  Pelvic incidence 81 degrees lumbar lordosis 50 degrees.  Imaging was reviewed with the patient and shown to the patient in clinic today.    Assessment: Isthmic lumbosacral spondylolisthesis.  Lumbar stenosis with radiculopathy.    Plan: We discussed surgical treatment.  Given the degree of his spondylolisthesis as well as the small pedicle sizes at L5 I have recommended an L4 to pelvis instrumented fusion with transforaminal lumbar interbody fusions and Leone-Morocho osteotomies.  We will work on scheduling this in the near future. The risks benefits and alternatives to the procedure were discussed, questions solicited and answered, and the patient wishes to proceed with surgery.          "

## 2019-12-09 NOTE — NURSING NOTE
Pre-operative Education    Education included but not limited to:  - Pre-operative physical with primary care physician within 30 days of surgical date.   - Pre-operative clearance (cardiology, hematology, etc).   - Discontinue Aspirin, NSAIDs, Diclofenac x 7 days prior to surgical date.   -May try tylenol for pain 1000 mg three times per day for pain  -Do not begin taking Non-Steroidal Anti-Inflammatory Drugs or NSAIDs (Advil,Motrin, Ibuprofen,Nuprin, Diclofenac,Meloxicam, Aleve, Celebrex, Aspirin, etc.) until 6 weeks after surgery if you had a fusion. May cause bleeding and interfere with bone healing.  -Patient is a current smoker.Patient educated on the importance of smoking cessation and how smoking inhibits healing. Patient received handout of resources to quit smoking. Patient does not have a current plan on smoking cessation but is open to it. He will discuss with his PCP.   -Forms to be completed: none per patient  -Surgical risks: blood clots in the leg or lung, problems urinating, nerve damage, drainage from the incision, infection,stiffness  -Preparation timeline  - When to start NPO           -Special bathing procedure.Patient received Hibiclens and showering instruction sheet.   Hospital stay:    Checking in    The surgery itself     Recovery room    - Transition to the hospital room where you will begin your recovery  - Managing pain   - 4-7 night hospitalization.   - Post operative incisional pain x 1-2 weeks which will require pain medications and muscle relaxants.   -Do NOT drive or drink alcohol while taking narcotic pain medication.  -Post operative incision care-Keep your incision clean and dry at all times. OK to remove dressing on postop day 2. OK to shower on postop day 3 and allow water to run over incision, pat dry after shower. No bathing, swimming or submerging in water. Call immediately or come to ED if any drainage occurs, or you develop new pain. Watch for signs of infection: redness,  swelling, warmth, drainage, and fever of 101 degrees or higher. Notify clinic.   - Post operative activity limitations: 6-8 weeks, no lifting > 10 pounds, no bending, twisting, or overhead reaching.  -Ok to walk as tolerated, avoid bed rest and prolonged sitting.  -No contact sports until after follow up visit  -No high impact activities such as; running/jogging, snowmobile or 4 hinojosa riding or any other recreational vehicles.    - Post op follow up appointments: 2 weeks for suture/staple removal and 6 weeks  with Physician Assistant or Nurse Practitioner with X-ray prior. Please call to schedule follow up appointment at [phone].   - Spine Education information and printout was also given to the patient for further review.    Patient verbalized understanding of above instructions. All questions were answered to the best of my ability and the patient's satisfaction. Patient advised to call with any additional questions or concerns.  Harriet Sherman RN

## 2019-12-09 NOTE — LETTER
12/9/2019         RE: Luis Eduardo Echevarria  [address] [place] [address]        Dear Colleague,    Thank you for referring your patient, Luis Eduardo Echevarria, to the [facility]. Please see a copy of my visit note below.    It was a pleasure to see Luis Eduardo Echevarria today in Neurosurgery Clinic. He is a 44 year old male who was seen by Sanya Mathis PA-C on November 11. Please see his note for full details of his symptoms.  He has been doing physical therapy with traction which he thinks is helped somewhat but he continues to have symptoms of back and left leg pain.    Past Medical History:   Diagnosis Date     Spondylolisthesis at L5-S1 level     grade 3     Past Surgical History:   Procedure Laterality Date     no surgeries        No Known Allergies    Current Outpatient Medications:      naproxen (NAPROSYN) 500 MG tablet, Take 1 tablet (500 mg) by mouth 2 times daily (with meals), Disp: 60 tablet, Rfl: 1     pregabalin (LYRICA) 50 MG capsule, Take 1 capsule (50 mg) by mouth daily, Disp: 30 capsule, Rfl: 1  Social History     Socioeconomic History     Marital status:      Spouse name: None     Number of children: None     Years of education: None     Highest education level: None   Occupational History     None   Social Needs     Financial resource strain: None     Food insecurity:     Worry: None     Inability: None     Transportation needs:     Medical: None     Non-medical: None   Tobacco Use     Smoking status: Current Every Day Smoker     Packs/day: 0.00     Smokeless tobacco: Former User   Substance and Sexual Activity     Alcohol use: Yes     Drug use: Never     Sexual activity: Yes     Partners: Female   Lifestyle     Physical activity:     Days per week: None     Minutes per session: None     Stress: None   Relationships     Social connections:     Talks on phone: None     Gets together: None     Attends Hoahaoism service: None     Active member of club or  "organization: None     Attends meetings of clubs or organizations: None     Relationship status: None     Intimate partner violence:     Fear of current or ex partner: None     Emotionally abused: None     Physically abused: None     Forced sexual activity: None   Other Topics Concern     None   Social History Narrative     None      Problem (# of Occurrences) Relation (Name,Age of Onset)    No Known Problems (2) Mother, Father           ROS: 10 point ROS neg other than the symptoms noted above in the HPI.    Vitals:    12/09/19 1333   BP: 133/88   Pulse: 86   Resp: 16   Temp: 98  F (36.7  C)   TempSrc: Oral   SpO2: 98%   Weight: 70.3 kg (155 lb)   Height: 1.702 m (5' 7\")     Body mass index is 24.28 kg/m .  Mild Pain (2)    Oswestry (NICKOLAS) Questionnaire    OSWESTRY DISABILITY INDEX 12/9/2019   Count 10   Sum 16   Oswestry Score (%) 32   Some recent data might be hidden       Visual Analog Scale (VAS) Questionnaire    No flowsheet data found.       Awake alert and oriented.  Strength 5 out of 5 bilateral lower extremities.    Imaging: MRI and x-rays of the lumbar spine demonstrate grade 2 spondylolisthesis at L5-S1.  Pelvic incidence 81 degrees lumbar lordosis 50 degrees.  Imaging was reviewed with the patient and shown to the patient in clinic today.    Assessment: Isthmic lumbosacral spondylolisthesis.  Lumbar stenosis with radiculopathy.    Plan: We discussed surgical treatment.  Given the degree of his spondylolisthesis as well as the small pedicle sizes at L5 I have recommended an L4 to pelvis instrumented fusion with transforaminal lumbar interbody fusions and Leone-Morocho osteotomies.  We will work on scheduling this in the near future. The risks benefits and alternatives to the procedure were discussed, questions solicited and answered, and the patient wishes to proceed with surgery.            Again, thank you for allowing me to participate in the care of your patient.        Sincerely,        Dimitri" Adrian Eason MD

## 2019-12-09 NOTE — NURSING NOTE
"Luis Eduardo Echevarria is a 44 year old male who presents for:  Chief Complaint   Patient presents with     Back Pain        Initial Vitals:  /88   Pulse 86   Temp 98  F (36.7  C) (Oral)   Resp 16   Ht 5' 7\" (1.702 m)   Wt 155 lb (70.3 kg)   SpO2 98%   BMI 24.28 kg/m   Estimated body mass index is 24.28 kg/m  as calculated from the following:    Height as of this encounter: 5' 7\" (1.702 m).    Weight as of this encounter: 155 lb (70.3 kg).. Body surface area is 1.82 meters squared. BP completed using cuff size: regular  Mild Pain (2)        Nursing Comments: Pt present today for back pain.        Vincent Disla, TYREE    "

## 2019-12-10 ENCOUNTER — HOSPITAL ENCOUNTER (INPATIENT)
Facility: CLINIC | Age: 44
Setting detail: SURGERY ADMIT
End: 2019-12-10
Attending: NEUROLOGICAL SURGERY | Admitting: NEUROLOGICAL SURGERY
Payer: COMMERCIAL

## 2019-12-10 ENCOUNTER — PATIENT OUTREACH (OUTPATIENT)
Dept: CARE COORDINATION | Facility: CLINIC | Age: 44
End: 2019-12-10

## 2019-12-10 NOTE — PROGRESS NOTES
Clinic Care Coordination Contact  [facility]/Voicemail    Referral Source: PCP  Clinical Data: Care Coordinator Outreach  Outreach attempted x 2.  Left message on patient's voicemail with call back information and requested return call.  Plan: Care Coordinator sent care coordination introduction letter on 12/10/19 via mail. Care Coordinator will do no further outreaches at this time.      FELY Nesbitt   Care Coordination Team  [phone]

## 2019-12-10 NOTE — LETTER
Health Care Home - Access Care Plan    About Me:    Patient Name:  Luis Eduardo Echevarria    YOB: 1975  Age:                      44 year old   Jennifer MRN:     [id number] Telephone Information:   Home Phone [phone]   Mobile [phone]       Address:  [address] [place] [address] Email address:  [email or web address]      Emergency Contact(s)   Name Relationship Lgl Grd Work Phone Home Phone Mobile Phone   1. OFELIA VALLADARES Relative   [phone]   2. DECLINED, PER * Declined                 Health Maintenance:      My Access Plan  Medical Emergency 911   Questions or concerns during clinic hours Primary Clinic Line, I will call the clinic directly: [facility] [phone]   24 Hour Appointment Line [phone] or  [phone]) (toll free)   24 Hour Nurse Line [phone] (toll free)   Questions or concerns outside clinic hours 24 Hour Appointment Line, I will call the after-hours on-call line:   [facility] [phone] or [phone]) (toll-free)   Preferred Urgent Care     Preferred Hospital     Preferred Pharmacy [facility] - [place], [place] - [address] Behavioral Health Crisis Line The National Suicide Prevention Lifeline at 1-730.875.5217 or 912                     My Care Team Members  Patient Care Team       Relationship Specialty Notifications Start End    Nadeem Joseph MD PCP - General Family Practice  10/25/19     Phone: [phone] [address] [facility] [address]    Nadeem Joseph MD Assigned PCP   9/27/19     Phone: [phone] [address] [facility] [address]           My Medical and Care Information  Problem List   Patient Active Problem List   Diagnosis     Low back pain     Isthmic spondylolisthesis     Spinal stenosis of lumbar region with radiculopathy      Current Medications and Allergies:  See printed Medication Report

## 2019-12-10 NOTE — LETTER
[place] CARE COORDINATION  [facility]  December 10, 2019    Luis Eduardo Echevarria  [address] [place] [address]      Dear Luis Eduardo,    I am a clinic care coordinator who works with Nadeem Joseph MD at [facility]. I recently tried to call and was unable to reach you. I wanted to introduce myself and provide you with my contact information so that you can call me with questions or concerns about your health care. Below is a description of clinic care coordination and how I may be able to further assist you.     The clinic care coordinator is a registered nurse and/or  who understand the health care system. The goal of clinic care coordination is to help you manage your health and improve access to the [facility] system in the most efficient manner. The registered nurse can assist you in meeting your health care goals by providing education, coordinating services, and strengthening the communication among your providers. The  can assist you with financial, behavioral, psychosocial, chemical dependency, counseling, and/or other communty resources.    Please feel free to contact me at [phone], with any questions or concerns. We at [facility] are focused on providing you with the highest-quality healthcare experience possible and that all starts with you.     Sincerely,     FELY Calderon    Enclosed: I have enclosed a copy of a 24 Hour Access Plan. This has helpful phone numbers for you to call when needed. Please keep this in an easy to access place to use as needed.

## 2019-12-19 ENCOUNTER — THERAPY VISIT (OUTPATIENT)
Dept: PHYSICAL THERAPY | Facility: CLINIC | Age: 44
End: 2019-12-19
Payer: COMMERCIAL

## 2019-12-19 DIAGNOSIS — M54.50 LOW BACK PAIN: ICD-10-CM

## 2019-12-19 PROCEDURE — 97012 MECHANICAL TRACTION THERAPY: CPT | Mod: GP

## 2019-12-19 PROCEDURE — 97110 THERAPEUTIC EXERCISES: CPT | Mod: GP

## 2019-12-20 ENCOUNTER — PATIENT OUTREACH (OUTPATIENT)
Dept: CARE COORDINATION | Facility: CLINIC | Age: 44
End: 2019-12-20

## 2019-12-20 ENCOUNTER — VIRTUAL VISIT (OUTPATIENT)
Dept: FAMILY MEDICINE | Facility: CLINIC | Age: 44
End: 2019-12-20
Payer: COMMERCIAL

## 2019-12-20 DIAGNOSIS — M48.061 SPINAL STENOSIS OF LUMBAR REGION WITH RADICULOPATHY: Primary | ICD-10-CM

## 2019-12-20 DIAGNOSIS — M54.16 SPINAL STENOSIS OF LUMBAR REGION WITH RADICULOPATHY: Primary | ICD-10-CM

## 2019-12-20 PROCEDURE — 99441 ZZC PHYSICIAN TELEPHONE EVALUATION 5-10 MIN: CPT | Performed by: FAMILY MEDICINE

## 2019-12-20 NOTE — PROGRESS NOTES
"Luis Eduardo Echevarria is a 44 year old male who is being evaluated via a billable telephone visit.      The patient has been notified of following:     \"This telephone visit will be conducted via a call between you and your physician/provider. We have found that certain health care needs can be provided without the need for a physical exam.  This service lets us provide the care you need with a short phone conversation.  If a prescription is necessary we can send it directly to your pharmacy.  If lab work is needed we can place an order for that and you can then stop by our lab to have the test done at a later time.    If during the course of the call the physician/provider feels a telephone visit is not appropriate, you will not be charged for this service.\"     Consent has been obtained for this service by 1 care team member: yes. See the scanned image in the medical record.    Luis Eduardo Echevarria complains of, follow-up of back pain.    I have reviewed and updated the patient's Past Medical History, Social History, Family History and Medication List.    ALLERGIES  Patient has no known allergies.    JESUS Blankenship   (MA signature)    Additional provider notes:   Recently was prescribed Lyrica for neuropathic pain.  States he did not start this medication for fears of side effects.  His mom was recently diagnosed with cancer, currently in [place], and there is some belief that 1 of her medications' side effects was a contributing cause.  He does report continuing naproxen with symptom improvement.  The biggest improvement he had was from physical therapy in the start of adding traction as part of his physical therapy.  No new numbness, weakness or tingling.  States he has a follow-up appointment with me on January 10 for preoperative evaluation as he will be getting a lumbar fusion.  Otherwise voices no new concerns.    Assessment/Plan:  (M48.061,  M54.16) Spinal stenosis of lumbar region with radiculopathy  (primary " encounter diagnosis)  Comment: Patient will be undergoing lumbar fusion in approximately a month.  Did not start Lyrica due to fear of adverse effects.  Plan: Continue naproxen, discontinue Lyrica due to patient's fear of side effects.  Continue physical therapy    I have reviewed the note as documented above.  This accurately captures the substance of my conversation with the patient.      Total time of call between patient and provider was 5 minutes     Nadeem Joseph MD

## 2019-12-24 ASSESSMENT — ACTIVITIES OF DAILY LIVING (ADL): DEPENDENT_IADLS:: INDEPENDENT

## 2019-12-24 NOTE — PROGRESS NOTES
Clinic Care Coordination Contact    Clinic Care Coordination Contact  OUTREACH    Referral Information:  Referral Source: PCP    Primary Diagnosis: Other (include Comment box)    Chief Complaint   Patient presents with     Clinic Care Coordination - Initial     Finances        Universal Utilization: Appropriate use.  Clinic Utilization  Difficulty keeping appointments:: No  Compliance Concerns: No  No-Show Concerns: No  No PCP office visit in Past Year: No  Utilization    Last refreshed: 12/20/2019 12:20 PM:  Hospital Admissions 0           Last refreshed: 12/20/2019 12:20 PM:  ED Visits 0           Last refreshed: 12/20/2019 12:20 PM:  No Show Count (past year) 0              Current as of: 12/20/2019 12:20 PM              Clinical Concerns:  Current Medical Concerns:    M48.061,  M54.16) Spinal stenosis of lumbar region with radiculopathy  (primary encounter diagnosis)  Comment: Patient will be undergoing lumbar fusion on 1/23/20.  Per chart review : Patient reports he finds physical therapy helpful, reports 50% reduction in nerve types of pain in low back, which radiates to his left buttocks and thigh.  However, Recently started doing traction therapy at physical therapy, which he has found to be the most helpful.  This was started after recommendation by spinal surgery.  Overall, he is ambulatory independently however continues to have pain with prolonged physical activity including walking.     CAYLA ALVAREZ spoke by phone with Luis Eduardo regarding his  financial concerns.  In August he worked for one month with a company and one day after working he couldn't get out of bed, etc. due to back pain.  There was not a specific  injury that he can recall on the job - but he believes that the physical labor from the job caused the issue. He didn't fill out an injury report regarding an injury and his last day worked was 8/19/19.   He applied for workman's comp and was denied.    His county  sent him a  [place] Dept [place] Work Injury Report  form to complete to fill out but he doesn't know how to answer the questions of how he was injured etc.  He has applied for SSDI but knows that process can be lengthy.  CAYLA CC provided  him numbers for  and [employer] to explore if he can do anything legally.    Luis Eduardo is currently staying with his aunt.      Current Behavioral Concerns: NA    Education Provided to patient: Legal resources.   Pain  Pain (GOAL):: No  Health Maintenance Reviewed: Due/Overdue Preventative care visit (declines at this time)  Clinical Pathway: None    Medication Management:  Manages independently     Functional Status:  Dependent ADLs:: Independent  Dependent IADLs:: Independent  Bed or wheelchair confined:: No  Mobility Status: Independent  Fallen 2 or more times in the past year?: No  Any fall with injury in the past year?: No    Living Situation:  Current living arrangement:: I live in a private home with family  Type of residence:: Private home - stairs    Diet/Exercise/Sleep:  Inadequate nutrition (GOAL):: No  Food Insecurity: No  Tube Feeding: No  Exercise:: Currently not exercising  Inadequate activity/exercise (GOAL):: No  Significant changes in sleep pattern (GOAL): No    Transportation:  Transportation concerns (GOAL):: No  Transportation means:: Accessible car     Psychosocial:  Mental health DX:: No  Mental health management concern (GOAL):: No  Informal Support system:: Family     Financial/Insurance:   Financial/Insurance concerns (GOAL):: Yes       Resources and Interventions:  Current Resources:    ;   Community Resources: County Programs  Supplies used at home:: None  Equipment Currently Used at Home: none    Advance Care Plan/Directive  Advanced Care Plans/Directives on file:: No  Advanced Care Plan/Directive Status: Declined Further Information    Referrals Placed: Other Legal resources     Goals:   Goals        General    Financial Wellbeing (pt-stated)     Notes - Note edited   12/24/2019 11:01 AM by Maddison Bowman LSW    Goal Statement: I plan to work on improving my financial situation   Date Goal set: 12/20/19  Date to Achieve By: 4/30/20  Patient expressed understanding of goal: Yes  Action steps to achieve this goal:  1. I will consult with legal  Services regarding my workman's comp denial  to see what options I have.    CC provided me with contact information for [facility]  and [facility].    2. I will work with my [facility]  in completing the [place] Dept[place] Of human [place] Work Injury report.  3. I will access [facility] Economic support as needed (ie; SNAP, Cash Assistance).                Barriers: Lack of income  Strengths: Supportive aunt who he is living with at the present time.  Patient/Caregiver understanding: Luis Eduardo expressed understanding of what was discussed and AIDET communication was used during the encounter.    Outreach Frequency: monthly  Future Appointments              In 3 days Staci Rausch PTA [facility] [place]    In 2 weeks Nadeem Joseph MD [facility]    In 1 month Nurse, Sh Neuro [facility] Neurosurgery [facility], [place] KRISTEN    In 2 months Fausto Gibbons PA-C [facility] Neurosurgery [facility], [facility]    In 3 months Dimitri Eason MD [facility], [facility]          Plan: Luis Eduardo plans to consult with [facility] or [facility] Legal Services to explore if he has legal options regarding workers comp denial. He  P;ans to have back surgery on 1.23/20 as planned,  CAYLA ALVAREZ will follow up in one week.    FELY Nesbitt Care Coordination Team  [phone]

## 2020-01-06 ENCOUNTER — PATIENT OUTREACH (OUTPATIENT)
Dept: CARE COORDINATION | Facility: CLINIC | Age: 45
End: 2020-01-06

## 2020-01-06 ASSESSMENT — ACTIVITIES OF DAILY LIVING (ADL): DEPENDENT_IADLS:: INDEPENDENT

## 2020-01-06 NOTE — LETTER
[facility]  Complex Care Plan  About Me:    Patient Name:  Luis Eduardo Echevarria    YOB: 1975  Age:         44 year old   [place] MRN:    [id number] Telephone Information:  Home Phone [phone]   Mobile [phone]       Address:  [address] [place] [address] Email address:  [email or web address]      Emergency Contact(s)    Name Relationship Lgl Grd Work Phone Home Phone Mobile Phone   1. OFELIA VALLADARES Relative   [phone]   2. DECLINED, PER * Declined               Primary language:  English     needed? No   [facility] [employer] [facility]:  [phone] op. 1  Other communication barriers: None  Preferred Method of Communication:     Current living arrangement: I live in a private home with family  Mobility Status/ Medical Equipment: Independent    Health Maintenance  Health Maintenance Reviewed:      My Access Plan  Medical Emergency 911   Primary Clinic Line [facility] - [phone]   24 Hour Appointment Line [phone] or  [phone]) (toll-free)   24 Hour Nurse Line [phone] (toll-free)   Preferred Urgent Care     Preferred Hospital Other   Preferred Pharmacy [id number] - [place], [place] - [address] Behavioral Health Crisis Line The National Suicide Prevention Lifeline at 1-922.370.4647 or 911             My Care Team Members  Patient Care Team       Relationship Specialty Notifications Start End    Nadeem Joseph MD PCP - General Family Practice  10/25/19     Phone: [phone] [address] [facility] [id number]    Nadeem Joseph MD Assigned PCP   9/27/19     Phone: [phone] [address] [facility] [address]    Maddison Bowman LSW Lead Care Coordinator Primary Care - CC  12/24/19     Phone: [phone]                 My Care Plans  Self Management and Treatment Plan  Goals and (Comments)  Goals        General    Financial Wellbeing  (pt-stated)     Notes - Note edited  12/24/2019 11:01 AM by Maddison Bowman LSW    Goal Statement: I plan to work on improving my financial situation   Date Goal set: 12/20/19  Date to Achieve By: 4/30/20  Patient expressed understanding of goal: Yes  Action steps to achieve this goal:  1. I will consult with legal  Services regarding my workman's comp denial  to see what options I have.   CAYLA CC provided me with contact information for [facility]  and [facility].    2. I will work with my [facility]  in completing the [place] Dept[place] Work Injury report.  3. I will access [facility] Economic support as needed (ie; SNAP, Cash Assistance).                 Action Plans on File:                       Advance Care Plans/Directives Type:        My Medical and Care Information  Problem List   Patient Active Problem List   Diagnosis     Low back pain     Isthmic spondylolisthesis     Spinal stenosis of lumbar region with radiculopathy      Current Medications and Allergies:  See printed Medication Report.    Care Coordination Start Date: 12/20/2019   Frequency of Care Coordination: monthly   Form Last Updated: 01/06/2020

## 2020-01-10 ENCOUNTER — OFFICE VISIT (OUTPATIENT)
Dept: FAMILY MEDICINE | Facility: CLINIC | Age: 45
End: 2020-01-10
Payer: COMMERCIAL

## 2020-01-10 VITALS
BODY MASS INDEX: 24.48 KG/M2 | OXYGEN SATURATION: 99 % | HEIGHT: 67 IN | SYSTOLIC BLOOD PRESSURE: 110 MMHG | WEIGHT: 156 LBS | HEART RATE: 74 BPM | DIASTOLIC BLOOD PRESSURE: 68 MMHG | RESPIRATION RATE: 16 BRPM | TEMPERATURE: 98.4 F

## 2020-01-10 DIAGNOSIS — Z01.818 PREOP GENERAL PHYSICAL EXAM: Primary | ICD-10-CM

## 2020-01-10 DIAGNOSIS — M43.10 ANTEROLISTHESIS: ICD-10-CM

## 2020-01-10 LAB — HGB BLD-MCNC: 14.9 G/DL (ref 13.3–17.7)

## 2020-01-10 PROCEDURE — 85018 HEMOGLOBIN: CPT | Performed by: FAMILY MEDICINE

## 2020-01-10 PROCEDURE — 36415 COLL VENOUS BLD VENIPUNCTURE: CPT | Performed by: FAMILY MEDICINE

## 2020-01-10 PROCEDURE — 99214 OFFICE O/P EST MOD 30 MIN: CPT | Performed by: FAMILY MEDICINE

## 2020-01-10 ASSESSMENT — MIFFLIN-ST. JEOR: SCORE: 1551.24

## 2020-01-10 NOTE — PROGRESS NOTES
[facility]  [address] [place] [id number]  [phone]  Dept: [phone]    PRE-OP EVALUATION:  Today's date: 1/10/2020    Luis Eduardo Echevarria (: 1975) presents for pre-operative evaluation assessment as requested by Dr. Williamson.  He requires evaluation and anesthesia risk assessment prior to undergoing surgery/procedure for treatment of Low back .    Fax number for surgical facility:   Primary Physician: Nadeem Joseph  Type of Anesthesia Anticipated: General    Patient has a Health Care Directive or Living Will:  NO    Preop Questions 1/10/2020   Who is doing your surgery? dr. davion williamson   What are you having done? lower back surgery   Date of Surgery/Procedure: 2020   Facility or Hospital where procedure/surgery will be performed: [facility]   1.  Do you have a history of Heart attack, stroke, stent, coronary bypass surgery, or other heart surgery? No   2.  Do you ever have any pain or discomfort in your chest? No   3.  Do you have a history of  Heart Failure? No   4.   Are you troubled by shortness of breath when:  walking on a level surface, or up a slight hill, or at night? No   5.  Do you currently have a cold, bronchitis or other respiratory infection? No   6.  Do you have a cough, shortness of breath, or wheezing? No   7.  Do you sometimes get pains in the calves of your legs when you walk? No   8. Do you or anyone in your family have previous history of blood clots? No   9.  Do you or does anyone in your family have a serious bleeding problem such as prolonged bleeding following surgeries or cuts? No   10. Have you ever had problems with anemia or been told to take iron pills? No   11. Have you had any abnormal blood loss such as black, tarry or bloody stools? No   12. Have you ever had a blood transfusion? No   13. Have you or any of your relatives ever had problems with anesthesia? No   14. Do you have sleep apnea, excessive snoring or daytime drowsiness? No  "  15. Do you have any prosthetic heart valves? No   16. Do you have prosthetic joints? No         HPI:     HPI related to upcoming procedure: Patient is a pleasant 44-year-old male who presents presents the clinic for preoperative exam.  History of grade 3 anterolisthesis of L5 on S1.  Currently follows with orthopedic surgery.    Only attending physical therapy and takeing naproxen for pain control.  Has already been told by his orthopedic surgeon to discontinue naproxen 10 days leading up to the surgeon to decrease risk of the perioperative bleeding.  He is currently on no other chronic medications and has no other supplements.      MEDICAL HISTORY:     Patient Active Problem List    Diagnosis Date Noted     Isthmic spondylolisthesis 12/09/2019     Priority: Medium     Spinal stenosis of lumbar region with radiculopathy 12/09/2019     Priority: Medium     Low back pain 11/07/2019     Priority: Medium      Past Medical History:   Diagnosis Date     Spondylolisthesis at L5-S1 level     grade 3     Past Surgical History:   Procedure Laterality Date     no surgeries       Current Outpatient Medications   Medication Sig Dispense Refill     naproxen (NAPROSYN) 500 MG tablet Take 1 tablet (500 mg) by mouth 2 times daily (with meals) 60 tablet 1     OTC products: None, except as noted above    No Known Allergies   Latex Allergy: NO    Social History     Tobacco Use     Smoking status: Current Every Day Smoker     Packs/day: 0.00     Smokeless tobacco: Former User   Substance Use Topics     Alcohol use: Yes     History   Drug Use Unknown       REVIEW OF SYSTEMS:   Constitutional, neuro, ENT, endocrine, pulmonary, cardiac, gastrointestinal, genitourinary, musculoskeletal, integument and psychiatric systems are negative, except as otherwise noted.    EXAM:   /68 (BP Location: Right arm, Patient Position: Sitting)   Pulse 74   Temp 98.4  F (36.9  C) (Oral)   Resp 16   Ht 1.702 m (5' 7\")   Wt 70.8 kg (156 lb)   " SpO2 99%   BMI 24.43 kg/m      GENERAL APPEARANCE: healthy, alert and no distress     EYES: EOMI,  PERRL     HENT: ear canals and TM's normal and nose and mouth without ulcers or lesions     NECK: no adenopathy, no asymmetry, masses, or scars and thyroid normal to palpation     RESP: lungs clear to auscultation - no rales, rhonchi or wheezes     CV: regular rates and rhythm, normal S1 S2, no S3 or S4 and no murmur, click or rub     ABDOMEN:  soft, nontender, no HSM or masses and bowel sounds normal     MS: extremities normal- no gross deformities noted, no evidence of inflammation in joints     SKIN: no suspicious lesions or rashes     NEURO: Normal strength and tone, sensory exam grossly normal, mentation intact and speech normal     PSYCH: mentation appears normal. and affect normal/bright     LYMPHATICS: No cervical adenopathy    DIAGNOSTICS:     Labs Resulted Today:   Results for orders placed or performed in visit on 01/10/20   Hemoglobin     Status: None   Result Value Ref Range    Hemoglobin 14.9 13.3 - 17.7 g/dL       Recent Labs   Lab Test 10/25/19  1422      POTASSIUM 4.3   CR 0.90      Last Comprehensive Metabolic Panel:  Sodium   Date Value Ref Range Status   10/25/2019 138 133 - 144 mmol/L Final     Potassium   Date Value Ref Range Status   10/25/2019 4.3 3.4 - 5.3 mmol/L Final     Chloride   Date Value Ref Range Status   10/25/2019 106 94 - 109 mmol/L Final     Carbon Dioxide   Date Value Ref Range Status   10/25/2019 27 20 - 32 mmol/L Final     Anion Gap   Date Value Ref Range Status   10/25/2019 5 3 - 14 mmol/L Final     Glucose   Date Value Ref Range Status   10/25/2019 80 70 - 99 mg/dL Final     Urea Nitrogen   Date Value Ref Range Status   10/25/2019 11 7 - 30 mg/dL Final     Creatinine   Date Value Ref Range Status   10/25/2019 0.90 0.66 - 1.25 mg/dL Final     GFR Estimate   Date Value Ref Range Status   10/25/2019 >90 >60 mL/min/[1.73_m2] Final     Comment:     Non  GFR  Calc  Starting 12/18/2018, serum creatinine based estimated GFR (eGFR) will be   calculated using the Chronic Kidney Disease Epidemiology Collaboration   (CKD-EPI) equation.       Calcium   Date Value Ref Range Status   10/25/2019 8.5 8.5 - 10.1 mg/dL Final                 IMPRESSION:   Reason for surgery/procedure: Grade 3, L5 on S1 anterolisthesis.  Chronic low back pain with sciatica.  Diagnosis/reason for consult: Preoperative examination.    The proposed surgical procedure is considered INTERMEDIATE risk.    REVISED CARDIAC RISK INDEX  The patient has the following serious cardiovascular risks for perioperative complications such as (MI, PE, VFib and 3  AV Block):  No serious cardiac risks  INTERPRETATION: 0 risks: Class I (very low risk - 0.4% complication rate)    The patient has the following additional risks for perioperative complications:  No identified additional risks      ICD-10-CM    1. Preop general physical exam Z01.818    2. Anterolisthesis M43.10        RECOMMENDATIONS:       --Patient is to take all scheduled medications on the day of surgery EXCEPT for modifications listed below.    Anticoagulant or Antiplatelet Medication Use  NSAIDS: Naproxen (Naprosyn):   Stop 2-3 days prior to surgery to decrease risk of perioperative bleeding.      APPROVAL GIVEN to proceed with proposed procedure, without further diagnostic evaluation       Signed Electronically by: Nadeem Joseph MD    Copy of this evaluation report is provided to requesting physician.    [facility] Preop Guidelines    Revised Cardiac Risk Index

## 2020-02-04 ENCOUNTER — PATIENT OUTREACH (OUTPATIENT)
Dept: CARE COORDINATION | Facility: CLINIC | Age: 45
End: 2020-02-04

## 2020-02-05 SDOH — ECONOMIC STABILITY: FOOD INSECURITY: WITHIN THE PAST 12 MONTHS, THE FOOD YOU BOUGHT JUST DIDN'T LAST AND YOU DIDN'T HAVE MONEY TO GET MORE.: NEVER TRUE

## 2020-02-05 SDOH — ECONOMIC STABILITY: FOOD INSECURITY: WITHIN THE PAST 12 MONTHS, YOU WORRIED THAT YOUR FOOD WOULD RUN OUT BEFORE YOU GOT MONEY TO BUY MORE.: NEVER TRUE

## 2020-02-05 SDOH — ECONOMIC STABILITY: TRANSPORTATION INSECURITY
IN THE PAST 12 MONTHS, HAS LACK OF TRANSPORTATION KEPT YOU FROM MEETINGS, WORK, OR FROM GETTING THINGS NEEDED FOR DAILY LIVING?: NO

## 2020-02-05 SDOH — ECONOMIC STABILITY: TRANSPORTATION INSECURITY
IN THE PAST 12 MONTHS, HAS THE LACK OF TRANSPORTATION KEPT YOU FROM MEDICAL APPOINTMENTS OR FROM GETTING MEDICATIONS?: NO

## 2020-02-05 ASSESSMENT — ACTIVITIES OF DAILY LIVING (ADL): DEPENDENT_IADLS:: INDEPENDENT

## 2020-02-05 NOTE — PROGRESS NOTES
Clinic Care Coordination Contact    Follow Up Progress Note      Assessment: Luis Eduardo is currently in [place] to be with his mom who has been seriously ill.  He has not heard any news about the Work Injury report that he submitted.  He will be having surgery on 2/26/20 and CAYLA ALVAREZ will check in with him again before then.     Goals addressed this encounter:   Goals Addressed                 This Visit's Progress       Patient Stated      Financial Wellbeing (pt-stated)        Goal Statement: I plan to work on improving my financial situation   Date Goal set: 12/20/19  Date to Achieve By: 4/30/20  Patient expressed understanding of goal: Yes  Action steps to achieve this goal:  1. I will consult with legal  Services regarding my workman's comp denial  to see what options I have.   CAYLA ALVAREZ provided me with contact information for [facility]  and [facility].    2. I will work with my [facility]  in completing the [place] Dept. [place] [employer] [place] Work Injury report.  3. I will access [facility] Economic support as needed (ie; SNAP, Cash Assistance).    As of today's date 2/5/2020 goal is met at 26 - 50%.   Goal Status:  Active    Submitted Work Injury form and is waiting to hear back.              Intervention/Education provided during outreach: NA     Outreach Frequency: monthly    Plan:   He will be having surgery on 2/26/20..       Care Coordinator will follow up in 2.5 weeks    FELY Nesbitt Care Coordination Team  [phone]

## 2020-02-07 PROBLEM — M54.50 LOW BACK PAIN: Status: RESOLVED | Noted: 2019-11-07 | Resolved: 2020-02-07

## 2020-02-07 NOTE — PROGRESS NOTES
Subjective:  HPI                    Objective:  System    Physical Exam    General     ROS    Assessment/Plan:    DISCHARGE REPORT    Progress reporting period is from 11/7/2019 to 12/19/2019.       SUBJECTIVE  Subjective changes noted by patient: As of Luis Eduardo's last PT appointment he stated he did not have symptoms down his left leg, the pain has been consistently in his hip. His pain increases with lifting and prolonged sitting and standing.  States his surgery is scheduled for Jan. 23     Current pain level is: 2/10.     Changes in function:  Yes (See Goal flowsheet attached for changes in current functional level)  Adverse reaction to treatment or activity: None    OBJECTIVE  Changes noted in objective findings:  Yes,   Objective: AROM- WNL, except ext- 50%.  Fair + core strength with ex. below.       ASSESSMENT/PLAN  Updated problem list and treatment plan: Diagnosis 1:  LBP  STG/LTGs have been met or progress has been made towards goals:  Yes (See Goal flow sheet completed today.)  Assessment of Progress: The patient's condition is improving.  Self Management Plans:  Patient has been instructed in a home treatment program.    Luis Eduardo continues to require the following intervention to meet STG and LTG's:  PT intervention is no longer required to meet STG/LTG.  Luis Eduardo did not return for further PT sessions.     Recommendations:  This patient is ready to be discharged from therapy and continue their home treatment program.    Please refer to the daily flowsheet for treatment today, total treatment time and time spent performing 1:1 timed codes.

## 2020-02-18 ENCOUNTER — OFFICE VISIT (OUTPATIENT)
Dept: FAMILY MEDICINE | Facility: CLINIC | Age: 45
End: 2020-02-18
Payer: COMMERCIAL

## 2020-02-18 VITALS
WEIGHT: 159 LBS | OXYGEN SATURATION: 98 % | TEMPERATURE: 98.7 F | DIASTOLIC BLOOD PRESSURE: 66 MMHG | HEART RATE: 84 BPM | BODY MASS INDEX: 24.96 KG/M2 | SYSTOLIC BLOOD PRESSURE: 118 MMHG | HEIGHT: 67 IN | RESPIRATION RATE: 16 BRPM

## 2020-02-18 DIAGNOSIS — Z01.818 PREOP GENERAL PHYSICAL EXAM: Primary | ICD-10-CM

## 2020-02-18 DIAGNOSIS — M43.10 ANTEROLISTHESIS: ICD-10-CM

## 2020-02-18 PROCEDURE — 99214 OFFICE O/P EST MOD 30 MIN: CPT | Performed by: FAMILY MEDICINE

## 2020-02-18 ASSESSMENT — MIFFLIN-ST. JEOR: SCORE: 1559.85

## 2020-02-18 NOTE — PROGRESS NOTES
[facility]  [address] [place] [id number] [phone]  Dept: [phone]    PRE-OP EVALUATION:  Today's date: 2020    Luis Eduardo Echevarria (: 1975) presents for pre-operative evaluation assessment as requested by Dr. Eason.  He requires evaluation and anesthesia risk assessment prior to undergoing surgery/procedure for treatment of back surgery .    Fax number for surgical facility: not needed -- Critical access hospital  Primary Physician: Nadeem Joseph  Type of Anesthesia Anticipated: General    Patient has a Health Care Directive or Living Will:  NO    Preop Questions 2020   Who is doing your surgery? hunt   What are you having done? fussion   Date of Surgery/Procedure: 20   Facility or Hospital where procedure/surgery will be performed: [facility]   1.  Do you have a history of Heart attack, stroke, stent, coronary bypass surgery, or other heart surgery? No   2.  Do you ever have any pain or discomfort in your chest? No   3.  Do you have a history of  Heart Failure? No   4.   Are you troubled by shortness of breath when:  walking on a level surface, or up a slight hill, or at night? No   5.  Do you currently have a cold, bronchitis or other respiratory infection? No   6.  Do you have a cough, shortness of breath, or wheezing? No   7.  Do you sometimes get pains in the calves of your legs when you walk? No   8. Do you or anyone in your family have previous history of blood clots? No   9.  Do you or does anyone in your family have a serious bleeding problem such as prolonged bleeding following surgeries or cuts? No   10. Have you ever had problems with anemia or been told to take iron pills? No   11. Have you had any abnormal blood loss such as black, tarry or bloody stools? No   12. Have you ever had a blood transfusion? No   13. Have you or any of your relatives ever had problems with anesthesia? No   14. Do you have sleep apnea, excessive snoring or daytime  drowsiness? No   15. Do you have any prosthetic heart valves? No   16. Do you have prosthetic joints? No         HPI:     HPI related to upcoming procedure: Patient is a pleasant 44-year-old male who presents presents the clinic for preoperative exam.  History of grade 3 anterolisthesis of L5 on S1.  Currently follows with orthopedic surgery.    Preop exam on 1-.  Had to be rescheduled by neurosurgery due to scheduling conflict.    No new changes.  Otherwise feeling well.  Continues naproxen daily for pain control.      See problem list for active medical problems.  Problems all longstanding and stable, except as noted/documented.  See ROS for pertinent symptoms related to these conditions.      MEDICAL HISTORY:     Patient Active Problem List    Diagnosis Date Noted     Isthmic spondylolisthesis 12/09/2019     Priority: Medium     Spinal stenosis of lumbar region with radiculopathy 12/09/2019     Priority: Medium      Past Medical History:   Diagnosis Date     Spondylolisthesis at L5-S1 level     grade 3     Past Surgical History:   Procedure Laterality Date     no surgeries       Current Outpatient Medications   Medication Sig Dispense Refill     naproxen (NAPROSYN) 500 MG tablet Take 1 tablet (500 mg) by mouth 2 times daily (with meals) 60 tablet 1     OTC products: None, except as noted above    No Known Allergies   Latex Allergy: NO    Social History     Tobacco Use     Smoking status: Current Every Day Smoker     Packs/day: 0.00     Smokeless tobacco: Former User   Substance Use Topics     Alcohol use: Yes     History   Drug Use Unknown       REVIEW OF SYSTEMS:   Constitutional, neuro, ENT, endocrine, pulmonary, cardiac, gastrointestinal, genitourinary, musculoskeletal, integument and psychiatric systems are negative, except as otherwise noted.    EXAM:   /66 (BP Location: Right arm, Patient Position: Chair, Cuff Size: Adult Regular)   Pulse 84   Temp 98.7  F (37.1  C) (Oral)   Resp 16   Ht  "1.702 m (5' 7\")   Wt 72.1 kg (159 lb)   SpO2 98%   BMI 24.90 kg/m      GENERAL APPEARANCE: healthy, alert and no distress     EYES: EOMI,  PERRL     HENT: ear canals and TM's normal and nose and mouth without ulcers or lesions     NECK: no adenopathy, no asymmetry, masses, or scars and thyroid normal to palpation     RESP: lungs clear to auscultation - no rales, rhonchi or wheezes     CV: regular rates and rhythm, normal S1 S2, no S3 or S4 and no murmur, click or rub     ABDOMEN:  soft, nontender, no HSM or masses and bowel sounds normal     MS: extremities normal- no gross deformities noted, no evidence of inflammation in joints, FROM in all extremities.     SKIN: no suspicious lesions or rashes     NEURO: Normal strength and tone, sensory exam grossly normal, mentation intact and speech normal     PSYCH: mentation appears normal. and affect normal/bright     LYMPHATICS: No cervical adenopathy    DIAGNOSTICS:   EKG: Not indicated due to non-vascular surgery and low risk of event (age <65 and without cardiac risk factors)    Recent Labs   Lab Test 01/10/20  1355 10/25/19  1422   HGB 14.9  --    NA  --  138   POTASSIUM  --  4.3   CR  --  0.90        IMPRESSION:   Reason for surgery/procedure: Grade 3, L5 on S1 anterolisthesis.  Chronic low back pain with sciatica.  Diagnosis/reason for consult: Preoperative examination.    The proposed surgical procedure is considered INTERMEDIATE risk.    REVISED CARDIAC RISK INDEX  The patient has the following serious cardiovascular risks for perioperative complications such as (MI, PE, VFib and 3  AV Block):  No serious cardiac risks  INTERPRETATION: 0 risks: Class I (very low risk - 0.4% complication rate)    The patient has the following additional risks for perioperative complications:  No identified additional risks      ICD-10-CM    1. Preop general physical exam Z01.818    2. Anterolisthesis M43.10        RECOMMENDATIONS:     --Patient is to take all scheduled " medications on the day of surgery EXCEPT for modifications listed below.     Anticoagulant or Antiplatelet Medication Use  NSAIDS: Naproxen (Naprosyn):   Stop 2-3 days prior to surgery to decrease risk of perioperative bleeding.       APPROVAL GIVEN to proceed with proposed procedure, without further diagnostic evaluation       Signed Electronically by: Nadeem Joseph MD    Copy of this evaluation report is provided to requesting physician.    [facility] Preop Guidelines    Revised Cardiac Risk Index  [facility]  [address] [facility] [address] [phone]  Dept: [phone]

## 2020-02-26 ENCOUNTER — ANESTHESIA (OUTPATIENT)
Dept: SURGERY | Facility: CLINIC | Age: 45
End: 2020-02-26
Payer: COMMERCIAL

## 2020-02-26 ENCOUNTER — HOSPITAL ENCOUNTER (INPATIENT)
Facility: CLINIC | Age: 45
LOS: 5 days | Discharge: HOME | End: 2020-03-02
Attending: NEUROLOGICAL SURGERY | Admitting: NEUROLOGICAL SURGERY
Payer: COMMERCIAL

## 2020-02-26 ENCOUNTER — APPOINTMENT (OUTPATIENT)
Dept: GENERAL RADIOLOGY | Facility: CLINIC | Age: 45
End: 2020-02-26
Attending: NEUROLOGICAL SURGERY
Payer: COMMERCIAL

## 2020-02-26 ENCOUNTER — ANESTHESIA EVENT (OUTPATIENT)
Dept: SURGERY | Facility: CLINIC | Age: 45
End: 2020-02-26
Payer: COMMERCIAL

## 2020-02-26 DIAGNOSIS — Z98.1 HISTORY OF LUMBAR FUSION: Primary | ICD-10-CM

## 2020-02-26 LAB
ABO + RH BLD: NORMAL
ABO + RH BLD: NORMAL
BLD GP AB SCN SERPL QL: NORMAL
BLOOD BANK CMNT PATIENT-IMP: NORMAL
HGB BLD-MCNC: 13.4 G/DL (ref 13.3–17.7)
SPECIMEN EXP DATE BLD: NORMAL

## 2020-02-26 PROCEDURE — 25000132 ZZH RX MED GY IP 250 OP 250 PS 637: Performed by: PHYSICIAN ASSISTANT

## 2020-02-26 PROCEDURE — 40000278 XR SURGERY CARM FLUORO LESS THAN 5 MIN

## 2020-02-26 PROCEDURE — 25000128 H RX IP 250 OP 636: Performed by: NURSE ANESTHETIST, CERTIFIED REGISTERED

## 2020-02-26 PROCEDURE — 22842 INSERT SPINE FIXATION DEVICE: CPT | Performed by: NEUROLOGICAL SURGERY

## 2020-02-26 PROCEDURE — 25800030 ZZH RX IP 258 OP 636: Performed by: ANESTHESIOLOGY

## 2020-02-26 PROCEDURE — 40000170 ZZH STATISTIC PRE-PROCEDURE ASSESSMENT II: Performed by: NEUROLOGICAL SURGERY

## 2020-02-26 PROCEDURE — 00QT0ZZ REPAIR SPINAL MENINGES, OPEN APPROACH: ICD-10-PCS | Performed by: NEUROLOGICAL SURGERY

## 2020-02-26 PROCEDURE — 0SG00AJ FUSION OF LUMBAR VERTEBRAL JOINT WITH INTERBODY FUSION DEVICE, POSTERIOR APPROACH, ANTERIOR COLUMN, OPEN APPROACH: ICD-10-PCS | Performed by: NEUROLOGICAL SURGERY

## 2020-02-26 PROCEDURE — 22214 INCIS 1 VERTEBRAL SEG LUMBAR: CPT | Mod: AS | Performed by: PHYSICIAN ASSISTANT

## 2020-02-26 PROCEDURE — 22634 ARTHRD CMBN 1NTRSPC EA ADDL: CPT | Mod: AS | Performed by: PHYSICIAN ASSISTANT

## 2020-02-26 PROCEDURE — 0QH104Z INSERTION OF INTERNAL FIXATION DEVICE INTO SACRUM, OPEN APPROACH: ICD-10-PCS | Performed by: NEUROLOGICAL SURGERY

## 2020-02-26 PROCEDURE — C1762 CONN TISS, HUMAN(INC FASCIA): HCPCS | Performed by: NEUROLOGICAL SURGERY

## 2020-02-26 PROCEDURE — 22633 ARTHRD CMBN 1NTRSPC LUMBAR: CPT | Mod: AS | Performed by: PHYSICIAN ASSISTANT

## 2020-02-26 PROCEDURE — 25000125 ZZHC RX 250: Performed by: NURSE ANESTHETIST, CERTIFIED REGISTERED

## 2020-02-26 PROCEDURE — 22633 ARTHRD CMBN 1NTRSPC LUMBAR: CPT | Performed by: NEUROLOGICAL SURGERY

## 2020-02-26 PROCEDURE — 20931 SP BONE ALGRFT STRUCT ADD-ON: CPT | Performed by: NEUROLOGICAL SURGERY

## 2020-02-26 PROCEDURE — 25000128 H RX IP 250 OP 636: Performed by: PHYSICIAN ASSISTANT

## 2020-02-26 PROCEDURE — 37000008 ZZH ANESTHESIA TECHNICAL FEE, 1ST 30 MIN: Performed by: NEUROLOGICAL SURGERY

## 2020-02-26 PROCEDURE — 27810169 ZZH OR IMPLANT GENERAL: Performed by: NEUROLOGICAL SURGERY

## 2020-02-26 PROCEDURE — C1713 ANCHOR/SCREW BN/BN,TIS/BN: HCPCS | Performed by: NEUROLOGICAL SURGERY

## 2020-02-26 PROCEDURE — 85018 HEMOGLOBIN: CPT | Performed by: ANESTHESIOLOGY

## 2020-02-26 PROCEDURE — 86901 BLOOD TYPING SEROLOGIC RH(D): CPT | Performed by: ANESTHESIOLOGY

## 2020-02-26 PROCEDURE — 25000128 H RX IP 250 OP 636: Performed by: ANESTHESIOLOGY

## 2020-02-26 PROCEDURE — 22214 INCIS 1 VERTEBRAL SEG LUMBAR: CPT | Mod: 51 | Performed by: NEUROLOGICAL SURGERY

## 2020-02-26 PROCEDURE — 22848 INSERT PELV FIXATION DEVICE: CPT | Mod: AS | Performed by: PHYSICIAN ASSISTANT

## 2020-02-26 PROCEDURE — 71000012 ZZH RECOVERY PHASE 1 LEVEL 1 FIRST HR: Performed by: NEUROLOGICAL SURGERY

## 2020-02-26 PROCEDURE — 37000009 ZZH ANESTHESIA TECHNICAL FEE, EACH ADDTL 15 MIN: Performed by: NEUROLOGICAL SURGERY

## 2020-02-26 PROCEDURE — 25000125 ZZHC RX 250: Performed by: NEUROLOGICAL SURGERY

## 2020-02-26 PROCEDURE — 27211219 ZZ H OR RESEVOIR 3L 150 MICRON FILTER CELLSAVER HARDSHELL 00205-00: Performed by: NEUROLOGICAL SURGERY

## 2020-02-26 PROCEDURE — 22848 INSERT PELV FIXATION DEVICE: CPT | Performed by: NEUROLOGICAL SURGERY

## 2020-02-26 PROCEDURE — 25800030 ZZH RX IP 258 OP 636: Performed by: NURSE ANESTHETIST, CERTIFIED REGISTERED

## 2020-02-26 PROCEDURE — 86891 AUTOLOGOUS BLOOD OP SALVAGE: CPT | Performed by: NEUROLOGICAL SURGERY

## 2020-02-26 PROCEDURE — P9041 ALBUMIN (HUMAN),5%, 50ML: HCPCS | Performed by: NURSE ANESTHETIST, CERTIFIED REGISTERED

## 2020-02-26 PROCEDURE — 22614 ARTHRD PST TQ 1NTRSPC EA ADD: CPT | Mod: AS | Performed by: PHYSICIAN ASSISTANT

## 2020-02-26 PROCEDURE — 27211224 ZZ H OR BOWL PROCESSING SET 225ML FOR ELITE MACHINE CSE-P-225: Performed by: NEUROLOGICAL SURGERY

## 2020-02-26 PROCEDURE — 25000301 ZZH OR RX SURGIFLO W/THROMBIN KIT 2ML 1991 OPNP: Performed by: NEUROLOGICAL SURGERY

## 2020-02-26 PROCEDURE — 71000013 ZZH RECOVERY PHASE 1 LEVEL 1 EA ADDTL HR: Performed by: NEUROLOGICAL SURGERY

## 2020-02-26 PROCEDURE — 25000566 ZZH SEVOFLURANE, EA 15 MIN: Performed by: NEUROLOGICAL SURGERY

## 2020-02-26 PROCEDURE — 86850 RBC ANTIBODY SCREEN: CPT | Performed by: ANESTHESIOLOGY

## 2020-02-26 PROCEDURE — 22216 INCIS ADDL SPINE SEGMENT: CPT | Performed by: NEUROLOGICAL SURGERY

## 2020-02-26 PROCEDURE — 36000077 ZZH SURGERY LEVEL 6 W FLUORO 1ST 30 MIN: Performed by: NEUROLOGICAL SURGERY

## 2020-02-26 PROCEDURE — 86900 BLOOD TYPING SEROLOGIC ABO: CPT | Performed by: ANESTHESIOLOGY

## 2020-02-26 PROCEDURE — 36415 COLL VENOUS BLD VENIPUNCTURE: CPT | Performed by: ANESTHESIOLOGY

## 2020-02-26 PROCEDURE — 12000000 ZZH R&B MED SURG/OB

## 2020-02-26 PROCEDURE — 61783 SCAN PROC SPINAL: CPT | Performed by: NEUROLOGICAL SURGERY

## 2020-02-26 PROCEDURE — 20936 SP BONE AGRFT LOCAL ADD-ON: CPT | Performed by: NEUROLOGICAL SURGERY

## 2020-02-26 PROCEDURE — 25800030 ZZH RX IP 258 OP 636: Performed by: PHYSICIAN ASSISTANT

## 2020-02-26 PROCEDURE — 22842 INSERT SPINE FIXATION DEVICE: CPT | Mod: AS | Performed by: PHYSICIAN ASSISTANT

## 2020-02-26 PROCEDURE — 22614 ARTHRD PST TQ 1NTRSPC EA ADD: CPT | Performed by: NEUROLOGICAL SURGERY

## 2020-02-26 PROCEDURE — 25000128 H RX IP 250 OP 636: Performed by: NEUROLOGICAL SURGERY

## 2020-02-26 PROCEDURE — 22634 ARTHRD CMBN 1NTRSPC EA ADDL: CPT | Performed by: NEUROLOGICAL SURGERY

## 2020-02-26 PROCEDURE — 22853 INSJ BIOMECHANICAL DEVICE: CPT | Performed by: NEUROLOGICAL SURGERY

## 2020-02-26 PROCEDURE — 0SG1071 FUSION OF 2 OR MORE LUMBAR VERTEBRAL JOINTS WITH AUTOLOGOUS TISSUE SUBSTITUTE, POSTERIOR APPROACH, POSTERIOR COLUMN, OPEN APPROACH: ICD-10-PCS | Performed by: NEUROLOGICAL SURGERY

## 2020-02-26 PROCEDURE — 20930 SP BONE ALGRFT MORSEL ADD-ON: CPT | Performed by: NEUROLOGICAL SURGERY

## 2020-02-26 PROCEDURE — 27211215 ZZ H OR FILTER BLOOD TRANS 40 MICRON SQ40S: Performed by: NEUROLOGICAL SURGERY

## 2020-02-26 PROCEDURE — 27211220 ZZ H OR ASSEMBLY BASIC A&A LINE 00208-00: Performed by: NEUROLOGICAL SURGERY

## 2020-02-26 PROCEDURE — 0SG3071 FUSION OF LUMBOSACRAL JOINT WITH AUTOLOGOUS TISSUE SUBSTITUTE, POSTERIOR APPROACH, POSTERIOR COLUMN, OPEN APPROACH: ICD-10-PCS | Performed by: NEUROLOGICAL SURGERY

## 2020-02-26 PROCEDURE — 22216 INCIS ADDL SPINE SEGMENT: CPT | Mod: AS | Performed by: PHYSICIAN ASSISTANT

## 2020-02-26 PROCEDURE — 27210794 ZZH OR GENERAL SUPPLY STERILE: Performed by: NEUROLOGICAL SURGERY

## 2020-02-26 PROCEDURE — 0SG30AJ FUSION OF LUMBOSACRAL JOINT WITH INTERBODY FUSION DEVICE, POSTERIOR APPROACH, ANTERIOR COLUMN, OPEN APPROACH: ICD-10-PCS | Performed by: NEUROLOGICAL SURGERY

## 2020-02-26 PROCEDURE — 27210995 ZZH RX 272: Performed by: NEUROLOGICAL SURGERY

## 2020-02-26 PROCEDURE — 36000075 ZZH SURGERY LEVEL 6 EA 15 ADDTL MIN: Performed by: NEUROLOGICAL SURGERY

## 2020-02-26 DEVICE — GRAFT BONE CRUSH CANC 30ML 400080: Type: IMPLANTABLE DEVICE | Site: SPINE LUMBAR | Status: FUNCTIONAL

## 2020-02-26 DEVICE — IMP SCR MEDT 5.5/6.0MM SOLERA 7.5X50MM MA 55840007550: Type: IMPLANTABLE DEVICE | Site: SPINE LUMBAR | Status: FUNCTIONAL

## 2020-02-26 DEVICE — IMP SCR MEDT 5.5/6.0MM SOLERA 7.5X45MM MA 55840007545: Type: IMPLANTABLE DEVICE | Site: SPINE LUMBAR | Status: FUNCTIONAL

## 2020-02-26 DEVICE — IMP SCR MEDT 5.5/6.0MM SOLERA 6.5X50MM MA 55840006550: Type: IMPLANTABLE DEVICE | Site: SPINE LUMBAR | Status: FUNCTIONAL

## 2020-02-26 DEVICE — IMP SPACER MEDT CAPSTONE CONTROL 14X22MM 18DEG 4021422: Type: IMPLANTABLE DEVICE | Site: SPINE LUMBAR | Status: FUNCTIONAL

## 2020-02-26 DEVICE — IMP SCR SET MEDT SOLERA BREAK OFF 5.5MM TI 5540030: Type: IMPLANTABLE DEVICE | Site: SPINE LUMBAR | Status: FUNCTIONAL

## 2020-02-26 RX ORDER — PROCHLORPERAZINE MALEATE 5 MG
10 TABLET ORAL EVERY 6 HOURS PRN
Status: DISCONTINUED | OUTPATIENT
Start: 2020-02-26 | End: 2020-03-02 | Stop reason: HOSPADM

## 2020-02-26 RX ORDER — DEXAMETHASONE SODIUM PHOSPHATE 4 MG/ML
INJECTION, SOLUTION INTRA-ARTICULAR; INTRALESIONAL; INTRAMUSCULAR; INTRAVENOUS; SOFT TISSUE PRN
Status: DISCONTINUED | OUTPATIENT
Start: 2020-02-26 | End: 2020-02-26

## 2020-02-26 RX ORDER — ONDANSETRON 2 MG/ML
4 INJECTION INTRAMUSCULAR; INTRAVENOUS EVERY 6 HOURS PRN
Status: DISCONTINUED | OUTPATIENT
Start: 2020-02-26 | End: 2020-03-02 | Stop reason: HOSPADM

## 2020-02-26 RX ORDER — FENTANYL CITRATE 50 UG/ML
INJECTION, SOLUTION INTRAMUSCULAR; INTRAVENOUS PRN
Status: DISCONTINUED | OUTPATIENT
Start: 2020-02-26 | End: 2020-02-26

## 2020-02-26 RX ORDER — SODIUM CHLORIDE, SODIUM LACTATE, POTASSIUM CHLORIDE, CALCIUM CHLORIDE 600; 310; 30; 20 MG/100ML; MG/100ML; MG/100ML; MG/100ML
INJECTION, SOLUTION INTRAVENOUS CONTINUOUS PRN
Status: DISCONTINUED | OUTPATIENT
Start: 2020-02-26 | End: 2020-02-26

## 2020-02-26 RX ORDER — ACETAMINOPHEN 500 MG
500-1000 TABLET ORAL EVERY 6 HOURS PRN
Status: DISCONTINUED | OUTPATIENT
Start: 2020-02-26 | End: 2020-02-26

## 2020-02-26 RX ORDER — ONDANSETRON 4 MG/1
4 TABLET, ORALLY DISINTEGRATING ORAL EVERY 30 MIN PRN
Status: DISCONTINUED | OUTPATIENT
Start: 2020-02-26 | End: 2020-02-26

## 2020-02-26 RX ORDER — ACETAMINOPHEN 325 MG/1
975 TABLET ORAL ONCE
Status: COMPLETED | OUTPATIENT
Start: 2020-02-26 | End: 2020-02-26

## 2020-02-26 RX ORDER — AMOXICILLIN 250 MG
2 CAPSULE ORAL 2 TIMES DAILY
Status: DISCONTINUED | OUTPATIENT
Start: 2020-02-26 | End: 2020-03-02 | Stop reason: HOSPADM

## 2020-02-26 RX ORDER — IBUPROFEN 200 MG
400 TABLET ORAL EVERY 4 HOURS PRN
Status: ON HOLD | COMMUNITY
End: 2020-03-01

## 2020-02-26 RX ORDER — GABAPENTIN 300 MG/1
300 CAPSULE ORAL
Status: COMPLETED | OUTPATIENT
Start: 2020-02-26 | End: 2020-02-26

## 2020-02-26 RX ORDER — DEXAMETHASONE SODIUM PHOSPHATE 10 MG/ML
4 INJECTION, SOLUTION INTRAMUSCULAR; INTRAVENOUS ONCE
Status: DISCONTINUED | OUTPATIENT
Start: 2020-02-26 | End: 2020-02-26

## 2020-02-26 RX ORDER — NEOSTIGMINE METHYLSULFATE 1 MG/ML
VIAL (ML) INJECTION PRN
Status: DISCONTINUED | OUTPATIENT
Start: 2020-02-26 | End: 2020-02-26

## 2020-02-26 RX ORDER — ACETAMINOPHEN 325 MG/1
975 TABLET ORAL EVERY 8 HOURS SCHEDULED
Status: DISCONTINUED | OUTPATIENT
Start: 2020-02-26 | End: 2020-02-29

## 2020-02-26 RX ORDER — ACETAMINOPHEN 325 MG/1
650 TABLET ORAL EVERY 4 HOURS PRN
Status: DISCONTINUED | OUTPATIENT
Start: 2020-02-29 | End: 2020-02-29

## 2020-02-26 RX ORDER — NALOXONE HYDROCHLORIDE 0.4 MG/ML
.1-.4 INJECTION, SOLUTION INTRAMUSCULAR; INTRAVENOUS; SUBCUTANEOUS
Status: DISCONTINUED | OUTPATIENT
Start: 2020-02-26 | End: 2020-02-26

## 2020-02-26 RX ORDER — PROPOFOL 10 MG/ML
INJECTION, EMULSION INTRAVENOUS PRN
Status: DISCONTINUED | OUTPATIENT
Start: 2020-02-26 | End: 2020-02-26

## 2020-02-26 RX ORDER — CEFAZOLIN SODIUM 2 G/100ML
2 INJECTION, SOLUTION INTRAVENOUS
Status: COMPLETED | OUTPATIENT
Start: 2020-02-26 | End: 2020-02-26

## 2020-02-26 RX ORDER — ALBUMIN, HUMAN INJ 5% 5 %
SOLUTION INTRAVENOUS CONTINUOUS PRN
Status: DISCONTINUED | OUTPATIENT
Start: 2020-02-26 | End: 2020-02-26

## 2020-02-26 RX ORDER — SODIUM CHLORIDE 9 MG/ML
INJECTION, SOLUTION INTRAVENOUS CONTINUOUS
Status: DISCONTINUED | OUTPATIENT
Start: 2020-02-26 | End: 2020-02-29

## 2020-02-26 RX ORDER — ONDANSETRON 2 MG/ML
INJECTION INTRAMUSCULAR; INTRAVENOUS PRN
Status: DISCONTINUED | OUTPATIENT
Start: 2020-02-26 | End: 2020-02-26

## 2020-02-26 RX ORDER — OXYCODONE HYDROCHLORIDE 5 MG/1
5-10 TABLET ORAL
Status: DISCONTINUED | OUTPATIENT
Start: 2020-02-26 | End: 2020-02-29

## 2020-02-26 RX ORDER — SODIUM CHLORIDE 9 MG/ML
INJECTION, SOLUTION INTRAVENOUS CONTINUOUS PRN
Status: DISCONTINUED | OUTPATIENT
Start: 2020-02-26 | End: 2020-02-26

## 2020-02-26 RX ORDER — METOCLOPRAMIDE HYDROCHLORIDE 5 MG/ML
10 INJECTION INTRAMUSCULAR; INTRAVENOUS EVERY 6 HOURS PRN
Status: DISCONTINUED | OUTPATIENT
Start: 2020-02-26 | End: 2020-03-02 | Stop reason: HOSPADM

## 2020-02-26 RX ORDER — LIDOCAINE HYDROCHLORIDE 20 MG/ML
INJECTION, SOLUTION INFILTRATION; PERINEURAL PRN
Status: DISCONTINUED | OUTPATIENT
Start: 2020-02-26 | End: 2020-02-26

## 2020-02-26 RX ORDER — VANCOMYCIN HYDROCHLORIDE 1 G/20ML
INJECTION, POWDER, LYOPHILIZED, FOR SOLUTION INTRAVENOUS PRN
Status: DISCONTINUED | OUTPATIENT
Start: 2020-02-26 | End: 2020-02-26 | Stop reason: HOSPADM

## 2020-02-26 RX ORDER — METOCLOPRAMIDE 5 MG/1
10 TABLET ORAL EVERY 6 HOURS PRN
Status: DISCONTINUED | OUTPATIENT
Start: 2020-02-26 | End: 2020-03-02 | Stop reason: HOSPADM

## 2020-02-26 RX ORDER — GLYCOPYRROLATE 0.2 MG/ML
INJECTION, SOLUTION INTRAMUSCULAR; INTRAVENOUS PRN
Status: DISCONTINUED | OUTPATIENT
Start: 2020-02-26 | End: 2020-02-26

## 2020-02-26 RX ORDER — AMOXICILLIN 250 MG
1 CAPSULE ORAL 2 TIMES DAILY
Status: DISCONTINUED | OUTPATIENT
Start: 2020-02-26 | End: 2020-03-02 | Stop reason: HOSPADM

## 2020-02-26 RX ORDER — ACETAMINOPHEN 500 MG
500-1000 TABLET ORAL EVERY 6 HOURS PRN
Status: ON HOLD | COMMUNITY
End: 2020-03-01

## 2020-02-26 RX ORDER — HYDROMORPHONE HYDROCHLORIDE 1 MG/ML
.3-.5 INJECTION, SOLUTION INTRAMUSCULAR; INTRAVENOUS; SUBCUTANEOUS EVERY 5 MIN PRN
Status: DISCONTINUED | OUTPATIENT
Start: 2020-02-26 | End: 2020-02-26

## 2020-02-26 RX ORDER — CEFAZOLIN SODIUM 1 G/3ML
1 INJECTION, POWDER, FOR SOLUTION INTRAMUSCULAR; INTRAVENOUS SEE ADMIN INSTRUCTIONS
Status: DISCONTINUED | OUTPATIENT
Start: 2020-02-26 | End: 2020-02-26

## 2020-02-26 RX ORDER — METHOCARBAMOL 750 MG/1
750 TABLET, FILM COATED ORAL EVERY 6 HOURS PRN
Status: DISCONTINUED | OUTPATIENT
Start: 2020-02-26 | End: 2020-03-02 | Stop reason: HOSPADM

## 2020-02-26 RX ORDER — PROPOFOL 10 MG/ML
INJECTION, EMULSION INTRAVENOUS CONTINUOUS PRN
Status: DISCONTINUED | OUTPATIENT
Start: 2020-02-26 | End: 2020-02-26

## 2020-02-26 RX ORDER — SODIUM CHLORIDE, SODIUM LACTATE, POTASSIUM CHLORIDE, CALCIUM CHLORIDE 600; 310; 30; 20 MG/100ML; MG/100ML; MG/100ML; MG/100ML
INJECTION, SOLUTION INTRAVENOUS CONTINUOUS
Status: DISCONTINUED | OUTPATIENT
Start: 2020-02-26 | End: 2020-02-26

## 2020-02-26 RX ORDER — ONDANSETRON 4 MG/1
4 TABLET, ORALLY DISINTEGRATING ORAL EVERY 6 HOURS PRN
Status: DISCONTINUED | OUTPATIENT
Start: 2020-02-26 | End: 2020-03-02 | Stop reason: HOSPADM

## 2020-02-26 RX ORDER — FENTANYL CITRATE 50 UG/ML
25-50 INJECTION, SOLUTION INTRAMUSCULAR; INTRAVENOUS
Status: DISCONTINUED | OUTPATIENT
Start: 2020-02-26 | End: 2020-02-26

## 2020-02-26 RX ORDER — CALCIUM CARBONATE 500 MG/1
1000 TABLET, CHEWABLE ORAL 4 TIMES DAILY PRN
Status: DISCONTINUED | OUTPATIENT
Start: 2020-02-26 | End: 2020-03-02 | Stop reason: HOSPADM

## 2020-02-26 RX ORDER — MEPERIDINE HYDROCHLORIDE 25 MG/ML
12.5 INJECTION INTRAMUSCULAR; INTRAVENOUS; SUBCUTANEOUS EVERY 5 MIN PRN
Status: DISCONTINUED | OUTPATIENT
Start: 2020-02-26 | End: 2020-02-26

## 2020-02-26 RX ORDER — EPHEDRINE SULFATE 50 MG/ML
INJECTION, SOLUTION INTRAMUSCULAR; INTRAVENOUS; SUBCUTANEOUS PRN
Status: DISCONTINUED | OUTPATIENT
Start: 2020-02-26 | End: 2020-02-26

## 2020-02-26 RX ORDER — LIDOCAINE 40 MG/G
CREAM TOPICAL
Status: DISCONTINUED | OUTPATIENT
Start: 2020-02-26 | End: 2020-03-02 | Stop reason: HOSPADM

## 2020-02-26 RX ORDER — FENTANYL CITRATE 50 UG/ML
50-100 INJECTION, SOLUTION INTRAMUSCULAR; INTRAVENOUS
Status: DISCONTINUED | OUTPATIENT
Start: 2020-02-26 | End: 2020-02-26

## 2020-02-26 RX ORDER — ALBUTEROL SULFATE 0.83 MG/ML
2.5 SOLUTION RESPIRATORY (INHALATION) EVERY 4 HOURS PRN
Status: DISCONTINUED | OUTPATIENT
Start: 2020-02-26 | End: 2020-02-26

## 2020-02-26 RX ORDER — ONDANSETRON 2 MG/ML
4 INJECTION INTRAMUSCULAR; INTRAVENOUS EVERY 30 MIN PRN
Status: DISCONTINUED | OUTPATIENT
Start: 2020-02-26 | End: 2020-02-26

## 2020-02-26 RX ORDER — NALOXONE HYDROCHLORIDE 0.4 MG/ML
.1-.4 INJECTION, SOLUTION INTRAMUSCULAR; INTRAVENOUS; SUBCUTANEOUS
Status: DISCONTINUED | OUTPATIENT
Start: 2020-02-26 | End: 2020-03-02 | Stop reason: HOSPADM

## 2020-02-26 RX ADMIN — LIDOCAINE HYDROCHLORIDE 50 MG: 20 INJECTION, SOLUTION INFILTRATION; PERINEURAL at 11:45

## 2020-02-26 RX ADMIN — CEFAZOLIN SODIUM 2 G: 2 INJECTION, SOLUTION INTRAVENOUS at 11:51

## 2020-02-26 RX ADMIN — DEXMEDETOMIDINE HYDROCHLORIDE 0.3 MCG/KG/HR: 100 INJECTION, SOLUTION INTRAVENOUS at 12:23

## 2020-02-26 RX ADMIN — ROCURONIUM BROMIDE 20 MG: 10 INJECTION INTRAVENOUS at 12:24

## 2020-02-26 RX ADMIN — PROPOFOL 50 MCG/KG/MIN: 10 INJECTION, EMULSION INTRAVENOUS at 12:02

## 2020-02-26 RX ADMIN — ACETAMINOPHEN 975 MG: 325 TABLET, FILM COATED ORAL at 19:38

## 2020-02-26 RX ADMIN — FENTANYL CITRATE 50 MCG: 50 INJECTION, SOLUTION INTRAMUSCULAR; INTRAVENOUS at 11:44

## 2020-02-26 RX ADMIN — SODIUM CHLORIDE, POTASSIUM CHLORIDE, SODIUM LACTATE AND CALCIUM CHLORIDE: 600; 310; 30; 20 INJECTION, SOLUTION INTRAVENOUS at 12:03

## 2020-02-26 RX ADMIN — PHENYLEPHRINE HYDROCHLORIDE 0.25 MCG/KG/MIN: 10 INJECTION INTRAVENOUS at 13:27

## 2020-02-26 RX ADMIN — ROCURONIUM BROMIDE 20 MG: 10 INJECTION INTRAVENOUS at 14:14

## 2020-02-26 RX ADMIN — SODIUM CHLORIDE, POTASSIUM CHLORIDE, SODIUM LACTATE AND CALCIUM CHLORIDE: 600; 310; 30; 20 INJECTION, SOLUTION INTRAVENOUS at 17:55

## 2020-02-26 RX ADMIN — FENTANYL CITRATE 50 MCG: 50 INJECTION, SOLUTION INTRAMUSCULAR; INTRAVENOUS at 17:23

## 2020-02-26 RX ADMIN — ROCURONIUM BROMIDE 20 MG: 10 INJECTION INTRAVENOUS at 12:59

## 2020-02-26 RX ADMIN — PHENYLEPHRINE HYDROCHLORIDE 50 MCG: 10 INJECTION INTRAVENOUS at 14:58

## 2020-02-26 RX ADMIN — HYDROMORPHONE HYDROCHLORIDE 0.5 MG: 1 INJECTION, SOLUTION INTRAMUSCULAR; INTRAVENOUS; SUBCUTANEOUS at 12:27

## 2020-02-26 RX ADMIN — ROCURONIUM BROMIDE 40 MG: 10 INJECTION INTRAVENOUS at 11:46

## 2020-02-26 RX ADMIN — ACETAMINOPHEN 975 MG: 325 TABLET, FILM COATED ORAL at 10:22

## 2020-02-26 RX ADMIN — GLYCOPYRROLATE 0.6 MG: 0.2 INJECTION, SOLUTION INTRAMUSCULAR; INTRAVENOUS at 17:04

## 2020-02-26 RX ADMIN — SODIUM CHLORIDE, POTASSIUM CHLORIDE, SODIUM LACTATE AND CALCIUM CHLORIDE: 600; 310; 30; 20 INJECTION, SOLUTION INTRAVENOUS at 11:00

## 2020-02-26 RX ADMIN — Medication 5 MG: at 12:33

## 2020-02-26 RX ADMIN — Medication: at 18:18

## 2020-02-26 RX ADMIN — FENTANYL CITRATE 50 MCG: 50 INJECTION, SOLUTION INTRAMUSCULAR; INTRAVENOUS at 12:24

## 2020-02-26 RX ADMIN — HYDROMORPHONE HYDROCHLORIDE 0.5 MG: 1 INJECTION, SOLUTION INTRAMUSCULAR; INTRAVENOUS; SUBCUTANEOUS at 17:24

## 2020-02-26 RX ADMIN — HYDROMORPHONE HYDROCHLORIDE 0.5 MG: 1 INJECTION, SOLUTION INTRAMUSCULAR; INTRAVENOUS; SUBCUTANEOUS at 12:53

## 2020-02-26 RX ADMIN — SODIUM CHLORIDE, POTASSIUM CHLORIDE, SODIUM LACTATE AND CALCIUM CHLORIDE: 600; 310; 30; 20 INJECTION, SOLUTION INTRAVENOUS at 12:57

## 2020-02-26 RX ADMIN — CEFAZOLIN SODIUM 1 G: 2 INJECTION, SOLUTION INTRAVENOUS at 15:51

## 2020-02-26 RX ADMIN — NEOSTIGMINE METHYLSULFATE 3.5 MG: 1 INJECTION, SOLUTION INTRAVENOUS at 17:04

## 2020-02-26 RX ADMIN — PROPOFOL 50 MG: 10 INJECTION, EMULSION INTRAVENOUS at 11:48

## 2020-02-26 RX ADMIN — Medication 10 MG: at 13:33

## 2020-02-26 RX ADMIN — SENNOSIDES AND DOCUSATE SODIUM 1 TABLET: 8.6; 5 TABLET ORAL at 21:33

## 2020-02-26 RX ADMIN — ALBUMIN HUMAN: 0.05 INJECTION, SOLUTION INTRAVENOUS at 12:33

## 2020-02-26 RX ADMIN — PROPOFOL 150 MG: 10 INJECTION, EMULSION INTRAVENOUS at 11:45

## 2020-02-26 RX ADMIN — FENTANYL CITRATE 50 MCG: 50 INJECTION, SOLUTION INTRAMUSCULAR; INTRAVENOUS at 17:53

## 2020-02-26 RX ADMIN — ALBUMIN HUMAN: 0.05 INJECTION, SOLUTION INTRAVENOUS at 14:12

## 2020-02-26 RX ADMIN — CEFAZOLIN SODIUM 1 G: 2 INJECTION, SOLUTION INTRAVENOUS at 13:51

## 2020-02-26 RX ADMIN — SODIUM CHLORIDE: 9 INJECTION, SOLUTION INTRAVENOUS at 19:37

## 2020-02-26 RX ADMIN — PHENYLEPHRINE HYDROCHLORIDE 100 MCG: 10 INJECTION INTRAVENOUS at 15:52

## 2020-02-26 RX ADMIN — PHENYLEPHRINE HYDROCHLORIDE 100 MCG: 10 INJECTION INTRAVENOUS at 12:11

## 2020-02-26 RX ADMIN — ONDANSETRON 4 MG: 2 INJECTION INTRAMUSCULAR; INTRAVENOUS at 16:21

## 2020-02-26 RX ADMIN — DEXAMETHASONE SODIUM PHOSPHATE 4 MG: 4 INJECTION, SOLUTION INTRA-ARTICULAR; INTRALESIONAL; INTRAMUSCULAR; INTRAVENOUS; SOFT TISSUE at 12:16

## 2020-02-26 RX ADMIN — ROCURONIUM BROMIDE 20 MG: 10 INJECTION INTRAVENOUS at 15:38

## 2020-02-26 RX ADMIN — MIDAZOLAM HYDROCHLORIDE 2 MG: 1 INJECTION, SOLUTION INTRAMUSCULAR; INTRAVENOUS at 11:26

## 2020-02-26 RX ADMIN — Medication 5 MG: at 12:34

## 2020-02-26 RX ADMIN — GABAPENTIN 300 MG: 300 CAPSULE ORAL at 10:21

## 2020-02-26 RX ADMIN — Medication 5 MG: at 12:11

## 2020-02-26 RX ADMIN — PHENYLEPHRINE HYDROCHLORIDE 50 MCG: 10 INJECTION INTRAVENOUS at 15:20

## 2020-02-26 RX ADMIN — SODIUM CHLORIDE: 9 INJECTION, SOLUTION INTRAVENOUS at 16:37

## 2020-02-26 RX ADMIN — ROCURONIUM BROMIDE 20 MG: 10 INJECTION INTRAVENOUS at 13:37

## 2020-02-26 RX ADMIN — Medication 5 MG: at 12:48

## 2020-02-26 ASSESSMENT — LIFESTYLE VARIABLES: TOBACCO_USE: 0

## 2020-02-26 ASSESSMENT — MIFFLIN-ST. JEOR: SCORE: 1559.85

## 2020-02-26 ASSESSMENT — ENCOUNTER SYMPTOMS: ORTHOPNEA: 0

## 2020-02-26 ASSESSMENT — ACTIVITIES OF DAILY LIVING (ADL): ADLS_ACUITY_SCORE: 13

## 2020-02-26 NOTE — PROGRESS NOTES
Medication History Completed by Medication Scribe  Admission medication history interview status for the 2/26/2020  admission is complete. See EPIC admission navigator for prior to admission medications     Medication history sources: Patient, Surescripts and H&P  Medication history source reliability: Moderate  Adherence assessment: Good    Significant changes made to the medication list:  None      Additional medication history information:   None    Medication reconciliation completed by provider prior to medication history? No    Time spent in this activity: 15 minutes      Prior to Admission medications    Medication Sig Last Dose Taking? Auth Provider   acetaminophen (TYLENOL) 500 MG tablet Take 500-1,000 mg by mouth every 6 hours as needed for mild pain Over 2 weeks ago at prn Yes Reported, Patient   ibuprofen (ADVIL/MOTRIN) 200 MG tablet Take 400 mg by mouth every 4 hours as needed for mild pain Over 2 weeks ago at prn Yes Reported, Patient   naproxen (NAPROSYN) 500 MG tablet Take 1 tablet (500 mg) by mouth 2 times daily (with meals) Over 1 month ago at prn Yes Nadeem Joseph MD

## 2020-02-26 NOTE — BRIEF OP NOTE
[facility]    Brief Operative Note    Pre-operative diagnosis: Isthmic spondylolisthesis [M43.10]  Spinal stenosis of lumbar region with radiculopathy [M48.061, M54.16]  Post-operative diagnosis Same as pre-operative diagnosis    Procedure: Procedure(s):  Lumbar 3-Sacral 1 posterior segmental instrumentation. Pelvic Instrumentation. Bilateral Lumbar 3-Sacral 1 transforaminal interbody fusion and Leone Muro osteotomies. Posterior arthrodesis Lumbar 3-Sacral 1  Surgeon: Surgeon(s) and Role:     * Dimitri Eason MD - Primary     * Fausto Gibbons PA-C - Assisting  Anesthesia: General   Estimated blood loss: 1100 mL  Drains: None  Specimens: * No specimens in log *  Findings:   Incidental durotomy at sacral level, repaired primarily..  Complications: None.  Implants:   Implant Name Type Inv. Item Serial No.  Lot No. LRB No. Used   GRAFT BONE CRUSH CANC 30ML [id number] Bone/Tissue/Biologic GRAFT BONE CRUSH CANC 30ML [id number] MUSCULOSKELETAL HOLGUIN  N/A 1   GRAFT BONE CRUSH CANC 30ML [id number] Bone/Tissue/Biologic GRAFT BONE CRUSH CANC 30ML [id number] MUSCULOSKELETAL HOLGUIN  N/A 1   IMP SPACER MEDT CAPSTONE CONTROL [id number] 18DEG [id number] Metallic Hardware/[place] IMP SPACER MEDT CAPSTONE CONTROL [id number] 18DEG [id number]  [employer] [id number] N/A 2   IMP SPACER MEDT CAPSTONE CONTROL [id number] 18DEG [id number] Metallic Hardware/[place] IMP SPACER MEDT CAPSTONE CONTROL [id number] 18DEG [id number]  [employer] [id number] N/A 2   IMP SCR MEDT 5.5/6.0MM SOLERA 7.5X45MM MA [id number] Metallic Hardware/[place] IMP SCR MEDT 5.5/6.0MM SOLERA 7.5X45MM MA [id number]  [employer] 41 07, [id number] N/A 4   IMP SCR MEDT 5.5/6.0MM SOLERA 7.5X50MM MA [id number] Metallic Hardware/[place] IMP SCR MEDT 5.5/6.0MM SOLERA 7.5X50MM MA [id number]  [employer] 41 07, [id number] N/A 1   9.5 X 100MM BALLAST     41 07, [id number] N/A 2   IMP SCR MEDT 5.5/6.0MM SOLERA 6.5X50MM MA [id number]  Metallic Hardware/[place] IMP SCR MEDT 5.5/6.0MM SOLERA 6.5X50MM MA [id number]  [employer] 41 07, [id number] N/A 1   IMP SCR SET MEDT SOLERA BREAK OFF 5.5MM TI [id number] Metallic Hardware/[place] IMP SCR SET MEDT SOLERA BREAK OFF 5.5MM TI [id number]  [employer] 41 07, [id number] N/A 8   120 COCR MICHAEL     41 07, [id number] N/A 2

## 2020-02-26 NOTE — ANESTHESIA CARE TRANSFER NOTE
Patient: Luis Eduardo Echevarria    Procedure(s):  Lumbar 3-Sacral 1 posterior segmental instrumentation. Pelvic Instrumentation. Bilateral Lumbar 3-Sacral 1 transforaminal interbody fusion and Leone Muro osteotomies. Posterior arthrodesis Lumbar 3-Sacral 1    Diagnosis: Isthmic spondylolisthesis [M43.10]  Spinal stenosis of lumbar region with radiculopathy [M48.061, M54.16]  Diagnosis Additional Information: No value filed.    Anesthesia Type:   General, ETT     Note:  Airway :Face Mask  Patient transferred to:PACU  Comments: Neuromuscular blockade reversed after TOF 4/4, spontaneous respirations, adequate tidal volumes, followed commands to voice, oropharynx suctioned with soft flexible catheter, extubated atraumatically, extubated with suction, airway patent after extubation.  Oxygen via facemask at 6 liters per minute to PACU. Oxygen tubing connected to wall O2 in PACU, SpO2, NiBP, and EKG monitors and alarms on and functioning, report on patient's clinical status given to PACU RN, RN questions answered.   Handoff Report: Identifed the Patient, Identified the Reponsible Provider, Reviewed the pertinent medical history, Discussed the surgical course, Reviewed Intra-OP anesthesia mangement and issues during anesthesia, Set expectations for post-procedure period and Allowed opportunity for questions and acknowledgement of understanding      Vitals: (Last set prior to Anesthesia Care Transfer)    CRNA VITALS  2/26/2020 1644 - 2/26/2020 1724      2/26/2020             NIBP:  133/83    NIBP Mean:  101                Electronically Signed By: RAMSES Fish CRNA  February 26, 2020  5:24 PM

## 2020-02-26 NOTE — ANESTHESIA PROCEDURE NOTES
ARTERIAL LINE PROCEDURE NOTE:   Pre-Procedure  Performed by Jazzy Sanchez MD  Location: pre-op      Pre-Anesthestic Checklist: patient identified, risks and benefits discussed, informed consent, pre-op evaluation and at physician/surgeon's request    Timeout  Correct Patient: Yes   Correct Procedure: Yes   Correct Site: Yes         .   Procedure Documentation  Procedure: arterial line    Supine  Insertion Site:left.Skin infiltrated with 2 mL of 1% lidocaine. Injection technique: ultrasound guided, Seldinger Technique and Michael's test completed  .  .  Patient Prep/Sterile Barriers; all elements of maximal sterile barrier technique followed, mask, hat, sterile gown, sterile gloves, draped, hand hygiene, chlorhexidine gluconate and isopropyl alcohol, patient draped    Assessment/Narrative    Catheter: 20 gauge, 12 cm     Secured by suture  Tegaderm dressing used.    Arterial waveform: Yes     Comments:  Times one.  Good blood flow.  No complications.

## 2020-02-26 NOTE — ANESTHESIA PREPROCEDURE EVALUATION
Anesthesia Pre-Procedure Evaluation    Patient: Luis Eduardo Echevarria   MRN: [id number] : 1975          Preoperative Diagnosis: Isthmic spondylolisthesis [M43.10]  Spinal stenosis of lumbar region with radiculopathy [M48.061, M54.16]    Procedure(s):  Lumbar 4-Sacral 1 posterior segmental instrumentation. Pelvic Instrumentation. Bilateral Lumbar 4-Sacral 1 transforaminal interbody fusion and Leone Muro osteotomies. Posterior arthrodesis Lumbar 4-Sacral 1    Past Medical History:   Diagnosis Date     Spinal stenosis      Spondylolisthesis at L5-S1 level     grade 3     Past Surgical History:   Procedure Laterality Date     no surgeries       wisdom teeth         Anesthesia Evaluation     . Pt has had prior anesthetic.     No history of anesthetic complications          ROS/MED HX    ENT/Pulmonary:      (-) tobacco use, asthma and sleep apnea   Neurologic:       Cardiovascular:        (-) PEDRAZA, orthopnea/PND and syncope   METS/Exercise Tolerance:  >4 METS   Hematologic:         Musculoskeletal: Comment: Isthmic spondylolisthesis, spinal stenosis         GI/Hepatic:        (-) GERD   Renal/Genitourinary:         Endo:         Psychiatric:         Infectious Disease:         Malignancy:         Other:                          Physical Exam  Normal systems: dental    Airway   Mallampati: II  TM distance: >3 FB  Neck ROM: full    Dental     Cardiovascular   Rhythm and rate: regular      Pulmonary    breath sounds clear to auscultation            Lab Results   Component Value Date    HGB 13.4 2020     10/25/2019    POTASSIUM 4.3 10/25/2019    CHLORIDE 106 10/25/2019    CO2 27 10/25/2019    BUN 11 10/25/2019    CR 0.90 10/25/2019    GLC 80 10/25/2019    RUTH 8.5 10/25/2019       Preop Vitals  BP Readings from Last 3 Encounters:   20 112/79   20 118/66   01/10/20 110/68    Pulse Readings from Last 3 Encounters:   20 61   20 84   01/10/20 74      Resp Readings from Last 3 Encounters:  "  02/26/20 16   02/18/20 16   01/10/20 16    SpO2 Readings from Last 3 Encounters:   02/26/20 97%   02/18/20 98%   01/10/20 99%      Temp Readings from Last 1 Encounters:   02/26/20 36.8  C (98.3  F) (Temporal)    Ht Readings from Last 1 Encounters:   02/26/20 1.702 m (5' 7\")      Wt Readings from Last 1 Encounters:   02/26/20 72.1 kg (159 lb)    Estimated body mass index is 24.9 kg/m  as calculated from the following:    Height as of this encounter: 1.702 m (5' 7\").    Weight as of this encounter: 72.1 kg (159 lb).     No Known Allergies  Social History     Tobacco Use     Smoking status: Current Every Day Smoker     Packs/day: 1.00     Types: Cigarettes     Smokeless tobacco: Former User   Substance Use Topics     Alcohol use: Yes     Comment: 2x week     Prior to Admission medications    Medication Sig Start Date End Date Taking? Authorizing Provider   acetaminophen (TYLENOL) 500 MG tablet Take 500-1,000 mg by mouth every 6 hours as needed for mild pain   Yes Reported, Patient   ibuprofen (ADVIL/MOTRIN) 200 MG tablet Take 400 mg by mouth every 4 hours as needed for mild pain   Yes Reported, Patient   naproxen (NAPROSYN) 500 MG tablet Take 1 tablet (500 mg) by mouth 2 times daily (with meals) 12/6/19  Yes Nadeem Joseph MD     Current Facility-Administered Medications Ordered in Epic   Medication Dose Route Frequency Last Rate Last Dose     ceFAZolin (ANCEF) 1 g vial to attach to  ml bag for ADULT or 50 ml bag for PEDS  1 g Intravenous See Admin Instructions         ceFAZolin (ANCEF) intermittent infusion 2 g in 100 mL dextrose PRE-MIX  2 g Intravenous Pre-Op/Pre-procedure x 1 dose         dexamethasone PF (DECADRON) injection 4 mg  4 mg Intravenous Once         lactated ringers infusion   Intravenous Continuous         lidocaine 1 % 0.1-1 mL  0.1-1 mL Other Q1H PRN         No current [facility]-ordered outpatient medications on file.       lactated ringers       Recent Labs   Lab Test 10/25/19  1422    "   POTASSIUM 4.3   CHLORIDE 106   CO2 27   ANIONGAP 5   GLC 80   BUN 11   CR 0.90   RUTH 8.5     Recent Labs   Lab Test 02/26/20  1022 01/10/20  1355   HGB 13.4 14.9     Recent Labs   Lab Test 02/26/20  1022   ABO PENDING     No results for input(s): TROPI in the last 97851 hours.  No results for input(s): PH, PCO2, PO2, HCO3 in the last 03262 hours.  No results for input(s): HCG in the last 23281 hours.  No results found for this or any previous visit (from the past 744 hour(s)).  No results found for this or any previous visit (from the past 4320 hour(s)).      RECENT LABS:       Anesthesia Plan      History & Physical Review  History and physical reviewed and following examination; no interval change.    ASA Status:  2 .        Plan for General and ETT   PONV prophylaxis:  Ondansetron (or other 5HT-3)  Additional equipment: Arterial Line dexmed infusion      Postoperative Care      Consents  Anesthetic plan, risks, benefits and alternatives discussed with:  Patient..                 Jazzy Sanchez MD

## 2020-02-27 LAB
GLUCOSE BLDC GLUCOMTR-MCNC: 100 MG/DL (ref 70–99)
HGB BLD-MCNC: 9.4 G/DL (ref 13.3–17.7)

## 2020-02-27 PROCEDURE — 12000000 ZZH R&B MED SURG/OB

## 2020-02-27 PROCEDURE — L0625 LO FLEX L1-BELOW L5 PRE OTS: HCPCS

## 2020-02-27 PROCEDURE — 25800030 ZZH RX IP 258 OP 636: Performed by: PHYSICIAN ASSISTANT

## 2020-02-27 PROCEDURE — 25000132 ZZH RX MED GY IP 250 OP 250 PS 637: Performed by: PHYSICIAN ASSISTANT

## 2020-02-27 PROCEDURE — 99024 POSTOP FOLLOW-UP VISIT: CPT | Performed by: PHYSICIAN ASSISTANT

## 2020-02-27 PROCEDURE — 00000146 ZZHCL STATISTIC GLUCOSE BY METER IP

## 2020-02-27 PROCEDURE — 85018 HEMOGLOBIN: CPT | Performed by: PHYSICIAN ASSISTANT

## 2020-02-27 PROCEDURE — 36415 COLL VENOUS BLD VENIPUNCTURE: CPT | Performed by: PHYSICIAN ASSISTANT

## 2020-02-27 RX ADMIN — SENNOSIDES AND DOCUSATE SODIUM 1 TABLET: 8.6; 5 TABLET ORAL at 11:15

## 2020-02-27 RX ADMIN — ACETAMINOPHEN 975 MG: 325 TABLET, FILM COATED ORAL at 21:50

## 2020-02-27 RX ADMIN — SENNOSIDES AND DOCUSATE SODIUM 1 TABLET: 8.6; 5 TABLET ORAL at 21:50

## 2020-02-27 RX ADMIN — ACETAMINOPHEN 975 MG: 325 TABLET, FILM COATED ORAL at 16:12

## 2020-02-27 RX ADMIN — ACETAMINOPHEN 975 MG: 325 TABLET, FILM COATED ORAL at 06:06

## 2020-02-27 RX ADMIN — SODIUM CHLORIDE: 9 INJECTION, SOLUTION INTRAVENOUS at 09:00

## 2020-02-27 ASSESSMENT — ACTIVITIES OF DAILY LIVING (ADL)
ADLS_ACUITY_SCORE: 14
ADLS_ACUITY_SCORE: 15
ADLS_ACUITY_SCORE: 14
ADLS_ACUITY_SCORE: 15
ADLS_ACUITY_SCORE: 15
ADLS_ACUITY_SCORE: 14

## 2020-02-27 NOTE — OP NOTE
Procedure Date: 02/26/2020      PREOPERATIVE DIAGNOSIS:  L5 to S1 isthmus isthmic spondylolisthesis with stenosis and radiculopathy.      POSTOPERATIVE DIAGNOSIS:  L5 to S1 isthmus isthmic spondylolisthesis with stenosis and radiculopathy.      PROCEDURES:   1.  L3 to S1 posterior segmental instrumentation.   2.  S2 alar iliac pelvic instrumentation.   3.  Bilateral L4 to 5 and L5 to S1 transforaminal lumbar interbody fusion and Leone-Morocho osteotomies.   4.  Posterior arthrodesis L3 to S1.      SURGEON:  Dimitri Eason MD      ASSISTANT:  Fausto Gibbons PA-C      ANESTHESIA:  General endotracheal anesthesia.      ESTIMATED BLOOD LOSS:  1100 mL.      INDICATIONS:  The patient is a 45-year-old male with back and leg pain with significant grade 2 lumbar spondylolisthesis at L5 to S1.  He was brought for lumbar fusion. Please note that Fausto Gibbons PA-C's assistance was needed for positioning, retraction, suctioning, and closure.     DESCRIPTION OF PROCEDURE:  The patient was brought to the operating room.  General endotracheal anesthesia was induced.  The patient was rolled in the prone position on the [place] 4-poster table.  The back was prepped and draped in sterile fashion.  The midline exposure was performed from approximately L3 to the sacrum.  A sacral bony defect was identified and a small CSF leak was encountered during the exposure.  This was repaired primarily with a single 6-0 Prolene suture.  Once the exposure was performed, the O-arm was sterilely draped and navigation 3-dimensional imaging was obtained and the neuronavigation used to place pedicle screws bilaterally at L3 and L4 because planned screws at L5 were not possible because of the very small size of the pedicles at that level.  Bilateral S1 screws were placed and then bilateral S2 alar iliac screws were used.  These were all placed using the Medtronic Solera and ballast systems, respectively.  Once this was done, then the  osteotomies were performed by performing complete facetectomies at L4 to L5 and L5 to S1, removing both the facets and the ligamentum flavum first at L5-S1.  The disk space was identified and opened and serially distracted using the turn and rotate distractors.  Eventually a 14 mm x 22 mm x 18 degree Capstone control cages were placed and local autograft placed in the cage and packed between the cages at this level.  In addition this was also performed at the L4 to L5 level, completing both the transforaminal interbody fusion as well as a Smith-Morocho osteotomy in order to correct his deformity and increase lumbar lordosis.  Bilateral pedicle screws having been placed at L3 and L4.  Rods were contoured and seated in the sacral and pelvic instrumentation and then reduction towers used to reduce the L3 and L4 screws to the rods improving the alignment.  Serial compression was applied and some additional lordosis reducing the spondylolisthesis.  Once the instrumentation and alignment were felt to be in good position.  Posterior elements were decorticated and a combination of local autograft and allograft cancellous chips were used for arthrodesis from L3 to S1 posteriorly.  Evicel was placed over the previous encounter dural defect.  The wound was closed in a standard fashion after 1 gram of vancomycin powder was placed in the wound.  At this point, the patient was rolled back in supine position, awakened, extubated, and taken to the recovery room in good condition.         SEPIDEH KRAUSE MD             D: 2020   T: 2020   MT: DAVIN      Name:     FABRICE MUKHERJEE   MRN:      [id number]        Account:        [id number]   :      1975           Procedure Date: 2020      Document: [id number]

## 2020-02-27 NOTE — ANESTHESIA POSTPROCEDURE EVALUATION
Patient: Luis Eduardo Echevarria    Procedure(s):  Lumbar 3-Sacral 1 posterior segmental instrumentation. Pelvic Instrumentation. Bilateral Lumbar 3-Sacral 1 transforaminal interbody fusion and Leone Muro osteotomies. Posterior arthrodesis Lumbar 3-Sacral 1    Diagnosis:Isthmic spondylolisthesis [M43.10]  Spinal stenosis of lumbar region with radiculopathy [M48.061, M54.16]  Diagnosis Additional Information: No value filed.    Anesthesia Type:  General, ETT    Note:  Anesthesia Post Evaluation    Patient location during evaluation: PACU  Patient participation: Able to fully participate in evaluation  Level of consciousness: sleepy but conscious  Pain management: adequate  Airway patency: patent  Cardiovascular status: acceptable and hemodynamically stable  Respiratory status: nonlabored ventilation and acceptable  Hydration status: acceptable  PONV: none             Last vitals:  Vitals:    02/26/20 1907 02/26/20 1938 02/26/20 2002   BP: 118/72 114/64 110/63   Pulse:      Resp: 14 12 16   Temp: 36.4  C (97.5  F)     SpO2: 98% 98% 98%         Electronically Signed By: Jenaro Bo MD  February 26, 2020  8:10 PM

## 2020-02-27 NOTE — OR NURSING
Report called to [address] [facility], Joselin RUBIO.  Pt to room via cart with NA in escort.  All belongings sent with pt.  Family notified of transfer. Xray order released but notified by xray that it will occur tomorrow.

## 2020-02-27 NOTE — PROGRESS NOTES
"S: Pt was seen today at [facility] for eval/fitting for an LS corset as ordered.  OG:  Reduce pain and motion of the lumbar spine  A: The patient has refused to try donning the corset at this time due to pain.  At this time I have provided the patientt with a size 32-38\"panel LS corset with Velcro closures based on pants size and wraping the corset over the front of his body. The patient was instructed on donning/doffing; care and cleaning of the LSO was explained. Care was taken in selection of the proper size LSO to insure an intimate fit.    P: The patient was instructed to call if any problems or questions arise.   Rodrigo Davies CO LO      "

## 2020-02-27 NOTE — PROGRESS NOTES
Neurosurgery progress note    Postoperative day 1 status post L3-S1 posterior spinal fusion.    Patient states he is feeling relatively well given he just had surgery, his back pain is managed with his pain medications.  He has been kept flat overnight and will be careful until 12 PM postoperative day 1 at which point time he can begin to increase the head of the bed gradually as tolerated.    Exam    Alert and oriented no acute distress  Moving all extremities appropriately      Plan    Gradually increase head of the bed beginning at 12 PM and progressed to chair and ambulation as tolerated.    Lumbar x-rays when patient is able to stand    PT and OT when patient is ambulating.    He should wear his lumbar sacral corset when standing.

## 2020-02-28 LAB
GLUCOSE BLDC GLUCOMTR-MCNC: 105 MG/DL (ref 70–99)
HGB BLD-MCNC: 9.5 G/DL (ref 13.3–17.7)

## 2020-02-28 PROCEDURE — 25800030 ZZH RX IP 258 OP 636: Performed by: PHYSICIAN ASSISTANT

## 2020-02-28 PROCEDURE — 25000128 H RX IP 250 OP 636: Performed by: PHYSICIAN ASSISTANT

## 2020-02-28 PROCEDURE — 25000132 ZZH RX MED GY IP 250 OP 250 PS 637: Performed by: PHYSICIAN ASSISTANT

## 2020-02-28 PROCEDURE — 85018 HEMOGLOBIN: CPT | Performed by: PHYSICIAN ASSISTANT

## 2020-02-28 PROCEDURE — 36415 COLL VENOUS BLD VENIPUNCTURE: CPT | Performed by: PHYSICIAN ASSISTANT

## 2020-02-28 PROCEDURE — 00000146 ZZHCL STATISTIC GLUCOSE BY METER IP

## 2020-02-28 PROCEDURE — 12000000 ZZH R&B MED SURG/OB

## 2020-02-28 RX ADMIN — OXYCODONE HYDROCHLORIDE 10 MG: 5 TABLET ORAL at 10:27

## 2020-02-28 RX ADMIN — ONDANSETRON 4 MG: 2 INJECTION INTRAMUSCULAR; INTRAVENOUS at 12:06

## 2020-02-28 RX ADMIN — ACETAMINOPHEN 975 MG: 325 TABLET, FILM COATED ORAL at 14:25

## 2020-02-28 RX ADMIN — SENNOSIDES AND DOCUSATE SODIUM 1 TABLET: 8.6; 5 TABLET ORAL at 21:05

## 2020-02-28 RX ADMIN — SODIUM CHLORIDE: 9 INJECTION, SOLUTION INTRAVENOUS at 00:00

## 2020-02-28 RX ADMIN — SENNOSIDES AND DOCUSATE SODIUM 1 TABLET: 8.6; 5 TABLET ORAL at 10:27

## 2020-02-28 RX ADMIN — ACETAMINOPHEN 975 MG: 325 TABLET, FILM COATED ORAL at 06:09

## 2020-02-28 RX ADMIN — ACETAMINOPHEN 975 MG: 325 TABLET, FILM COATED ORAL at 21:04

## 2020-02-28 RX ADMIN — OXYCODONE HYDROCHLORIDE 10 MG: 5 TABLET ORAL at 14:25

## 2020-02-28 ASSESSMENT — ACTIVITIES OF DAILY LIVING (ADL)
ADLS_ACUITY_SCORE: 15
ADLS_ACUITY_SCORE: 13
ADLS_ACUITY_SCORE: 15
ADLS_ACUITY_SCORE: 16
ADLS_ACUITY_SCORE: 15
ADLS_ACUITY_SCORE: 13

## 2020-02-28 NOTE — PROGRESS NOTES
"[facility]    Neurosurgery Progress Note    Date of Service (when I saw the patient): 02/28/2020     Assessment & Plan   Luis Eduardo Echevarria is a 45 year old male who was admitted on 2/26/2020. He presented with significant grade 2 lumbar spondylolisthesis at L5-S1. On 2/26/2020 he underwent a L3-S1 PSF and S2 alar iliac pelvic instrumentation as well as bilateral L4-5 and L5-S1 TLIF with Dr. Eason. Today he was seen lying in bed. He reports incisional back pain. He denifes radicular pain, paresthesias, and weakness. He has been using a PCA for pain management. He has a walker intact. Incision intact and open to air.     Active Problems:    History of lumbar fusion    Assessment:  s/p L3-S2 alar iliac PSF    Plan:   -Transition to oral pain management today  -PT/OT and ambulation  -Discontinue walker when out of bed  -Encourage deep breathing and IS      I have discussed the following assessment and plan Dr. Eason who is in agreement with initial plan and will follow up with further consultation recommendations.    Tamara Dykes, [facility] Neurosurgery  [facility]  [address] [place] [address]    Tel [phone]  Pager [phone]      Interval History   Stable.     Physical Exam   Temp: 98.5  F (36.9  C) Temp src: Oral BP: 98/62 Pulse: 103 Heart Rate: 97 Resp: 14 SpO2: 93 % O2 Device: None (Room air) Oxygen Delivery: 2 LPM  Vitals:    02/26/20 0958   Weight: 159 lb (72.1 kg)     Vital Signs with Ranges  Temp:  [98.5  F (36.9  C)-100.4  F (38  C)] 98.5  F (36.9  C)  Pulse:  [103] 103  Heart Rate:  [] 97  Resp:  [14-16] 14  BP: ()/(50-69) 98/62  SpO2:  [93 %-96 %] 93 %  I/O last 3 completed shifts:  In: 2259 [P.O.:820; I.V.:1439]  Out: 1225 [Urine:1225]    Heart Rate: 97, Blood pressure 98/62, pulse 103, temperature 98.5  F (36.9  C), temperature source Oral, resp. rate 14, height 5' 7\" (1.702 m), weight 159 lb (72.1 kg), SpO2 93 " %.  159 lbs 0 oz  HEENT:  Normocephalic.  PERRLA.    Heart:  No peripheral edema  Lungs:  No SOB  Skin:  Warm and dry, good capillary refill. Incision intact, LEANDER.  Extremities:  Good radial and dorsalis pedis pulses bilaterally, no edema, cyanosis or clubbing.    NEUROLOGICAL EXAMINATION:   Mental status:  Alert and Oriented x 3, speech is fluent.  Cranial nerves:  II-XII intact.   Motor:  Strength is 5/5 throughout the upper and lower extremities  Sensation:  intact    Medications     HYDROmorphone       sodium chloride 75 mL/hr at 02/28/20 0000       acetaminophen  975 mg Oral Q8H SHELDON     senna-docusate  1 tablet Oral BID    Or     senna-docusate  2 tablet Oral BID     sodium chloride (PF)  3 mL Intracatheter Q8H       Data     CBC RESULTS:   Recent Labs   Lab Test 02/28/20  0801   HGB 9.5*     Basic Metabolic Panel:  Lab Results   Component Value Date     10/25/2019      Lab Results   Component Value Date    POTASSIUM 4.3 10/25/2019     Lab Results   Component Value Date    CHLORIDE 106 10/25/2019     Lab Results   Component Value Date    RUTH 8.5 10/25/2019     Lab Results   Component Value Date    CO2 27 10/25/2019     Lab Results   Component Value Date    BUN 11 10/25/2019     Lab Results   Component Value Date    CR 0.90 10/25/2019     Lab Results   Component Value Date    GLC 80 10/25/2019     INR:  No results found for: INR

## 2020-02-28 NOTE — PLAN OF CARE
POD 1 L3-S1 alert x 4, sleepy between cares, flat bedrest till 12 noon and has advanced HOB to 35 degrees, some tingling left leg which he had pre op, patient on PCA with fair relief, tolerating regular diet, will have walker out when up and moving per Dr Eason. Discharge plan pending.

## 2020-02-28 NOTE — PLAN OF CARE
POD 2 posterior spinal fusion.  CMS stable.  Baseline tingling in both feet.  Bilateral LE strength 4-5/5.  PCA discontinued started oxycodone.  Nausea after first dose and anxious after 2nd dose, wants to try just tylenol for now.  Back incision CDI, LEANDER, covered with skin adhesive.  Up to chair x 2 this shift.  Vu in place, discharge after able to ambulate.  Plan for PT eval tomorrow.    1830: Ambulated short distance in aponte with SBA & gait belt.  C/o 'bloating' feeling, no flatus yet.

## 2020-02-28 NOTE — PLAN OF CARE
POD #2 from a L3-S1 PSF. A&Ox4. CMS tingling in left foot-unchanged. Bowel sounds active, passing flatus, tolerating regular diet. VSS. Incision LEANDER WDL. Up with A1-2/turn. Vu patent. C/o 4/10 pain, decreased with PCA dilaudid-needing frequent reminders to push button. Turn and repo per patient request. Pt scoring green on the Aggression Stop Light Tool. Continue to monitor.

## 2020-02-29 ENCOUNTER — APPOINTMENT (OUTPATIENT)
Dept: GENERAL RADIOLOGY | Facility: CLINIC | Age: 45
End: 2020-02-29
Attending: NEUROLOGICAL SURGERY
Payer: COMMERCIAL

## 2020-02-29 ENCOUNTER — APPOINTMENT (OUTPATIENT)
Dept: PHYSICAL THERAPY | Facility: CLINIC | Age: 45
End: 2020-02-29
Attending: NURSE PRACTITIONER
Payer: COMMERCIAL

## 2020-02-29 PROCEDURE — 97161 PT EVAL LOW COMPLEX 20 MIN: CPT | Mod: GP

## 2020-02-29 PROCEDURE — 12000000 ZZH R&B MED SURG/OB

## 2020-02-29 PROCEDURE — 25000132 ZZH RX MED GY IP 250 OP 250 PS 637: Performed by: PHYSICIAN ASSISTANT

## 2020-02-29 PROCEDURE — 97116 GAIT TRAINING THERAPY: CPT | Mod: GP

## 2020-02-29 PROCEDURE — 97530 THERAPEUTIC ACTIVITIES: CPT | Mod: GP

## 2020-02-29 PROCEDURE — 40000986 XR LUMBAR SPINE 2-3 VIEWS

## 2020-02-29 PROCEDURE — 25000128 H RX IP 250 OP 636: Performed by: PHYSICIAN ASSISTANT

## 2020-02-29 RX ORDER — HYDROCODONE BITARTRATE AND ACETAMINOPHEN 5; 325 MG/1; MG/1
1-2 TABLET ORAL EVERY 4 HOURS PRN
Status: DISCONTINUED | OUTPATIENT
Start: 2020-02-29 | End: 2020-03-02 | Stop reason: HOSPADM

## 2020-02-29 RX ADMIN — ACETAMINOPHEN 975 MG: 325 TABLET, FILM COATED ORAL at 05:36

## 2020-02-29 RX ADMIN — HYDROCODONE BITARTRATE AND ACETAMINOPHEN 2 TABLET: 5; 325 TABLET ORAL at 17:13

## 2020-02-29 RX ADMIN — HYDROCODONE BITARTRATE AND ACETAMINOPHEN 2 TABLET: 5; 325 TABLET ORAL at 21:34

## 2020-02-29 RX ADMIN — HYDROCODONE BITARTRATE AND ACETAMINOPHEN 1 TABLET: 5; 325 TABLET ORAL at 09:01

## 2020-02-29 RX ADMIN — HYDROCODONE BITARTRATE AND ACETAMINOPHEN 1 TABLET: 5; 325 TABLET ORAL at 13:14

## 2020-02-29 RX ADMIN — SENNOSIDES AND DOCUSATE SODIUM 1 TABLET: 8.6; 5 TABLET ORAL at 20:36

## 2020-02-29 ASSESSMENT — ACTIVITIES OF DAILY LIVING (ADL)
ADLS_ACUITY_SCORE: 12

## 2020-02-29 NOTE — PROGRESS NOTES
"   02/29/20 1045   Quick Adds   Type of Visit Initial PT Evaluation   Living Environment   Lives With other relative(s)  (Aunt, cousin, dad)   Living Arrangements house   Home Accessibility no concerns;wheelchair accessible   Transportation Anticipated car, drives self;family or friend will provide   Living Environment Comment basement bedroom but there is a chair lift/elevator   Self-Care   Usual Activity Tolerance good   Current Activity Tolerance moderate   Regular Exercise No   Activity/Exercise/Self-Care Comment Pt works in construction   Functional Level Prior   Ambulation 0-->independent   Transferring 0-->independent   Toileting 0-->independent   Bathing 0-->independent   Communication 0-->understands/communicates without difficulty   Swallowing 0-->swallows foods/liquids without difficulty   Cognition 0 - no cognition issues reported   Fall history within last six months no   General Information   Onset of Illness/Injury or Date of Surgery - Date 02/26/20   Referring Physician Tamara Dykes NP   Patient/Family Goals Statement \"Go home\"   Pertinent History of Current Problem (include personal factors and/or comorbidities that impact the POC) 45 YOM diagnosed with L5-S1 spondylolisthesis no ws/p L3-S1 PSF, S2-alar pelvic instrumentation, B L4-L5 and L5-S1 TLIF.   Precautions/Limitations fall precautions;spinal precautions   Weight-Bearing Status - LLE full weight-bearing   Weight-Bearing Status - RLE full weight-bearing   General Observations Pleasant and cooperative   Cognitive Status Examination   Orientation orientation to person, place and time   Level of Consciousness alert   Follows Commands and Answers Questions 100% of the time;able to follow multistep instructions   Personal Safety and Judgment intact   Memory intact   Cognitive Comment no concerns   Pain Assessment   Patient Currently in Pain Yes, see Vital Sign flowsheet  (4/10 low back)   Posture    Posture Forward head " "position;Protracted shoulders   Range of Motion (ROM)   ROM Comment BLEs WFL   Strength   Strength Comments BLEs grossly 5/5 throughout   Bed Mobility   Bed Mobility Comments Supine>sit with SBA   Transfer Skills   Transfer Comments Sit>stand from EOB without a device with Mariusz   Gait   Gait Comments Gait without a device x 15' with Mariusz, wide KAILYN, reciprocal pattern but decreased step length B, reaching out for UE support, slow pace   Balance   Balance Comments Good sitting and static standing, requires UE support for dynamic standing tasks   Sensory Examination   Sensory Perception no deficits were identified   Coordination   Coordination no deficits were identified   General Therapy Interventions   Planned Therapy Interventions balance training;bed mobility training;gait training;transfer training;home program guidelines;progressive activity/exercise   Clinical Impression   Criteria for Skilled Therapeutic Intervention yes, treatment indicated   PT Diagnosis Impaired gait   Influenced by the following impairments pain, fatigue   Functional limitations due to impairments bed mobility, transfers, gait   Clinical Presentation Stable/Uncomplicated   Clinical Presentation Rationale see above   Clinical Decision Making (Complexity) Low complexity   Therapy Frequency Daily   Predicted Duration of Therapy Intervention (days/wks) 4 days   Anticipated Discharge Disposition Home with Assist   Risk & Benefits of therapy have been explained Yes   Patient, Family & other staff in agreement with plan of care Yes   Clinical Impression Comments Will trial FWW to improve balance and gait pattern   [facility] [employer] TM \"6 Clicks\"   2016, Trustees of [facility], under license to [employer].  All rights reserved.   6 Clicks Short Forms Basic Mobility Inpatient Short Form   [facility] [employer]  \"6 Clicks\" V.2 Basic Mobility Inpatient Short Form   1. Turning from your back to your side while in a flat bed without " using bedrails? 3 - A Little   2. Moving from lying on your back to sitting on the side of a flat bed without using bedrails? 3 - A Little   3. Moving to and from a bed to a chair (including a wheelchair)? 3 - A Little   4. Standing up from a chair using your arms (e.g., wheelchair, or bedside chair)? 3 - A Little   5. To walk in hospital room? 3 - A Little   6. Climbing 3-5 steps with a railing? 3 - A Little   Basic Mobility Raw Score (Score out of 24.Lower scores equate to lower levels of function) 18   Total Evaluation Time   Total Evaluation Time (Minutes) 10

## 2020-02-29 NOTE — PLAN OF CARE
POD 3 PSF.  CMS stable.  Baseline tingling on top of right foot.  Back incision CDI with liquid adhesive.  Back pain managed with Norco, took 2 tabs at 1715.  Up to chair, bathroom, walking in halls with SBA, gait belt, walker.  Vu removed, voiding per bathroom.  1st BS 163cc.  Continue to monitor.  Discharge to home when ready.

## 2020-02-29 NOTE — PLAN OF CARE
POD #3 from a L3-S1 PSF. A&Ox4. CMS tingling in left foot-unchanged. Bowel sounds active,unable to  pass flatus, tolerating regular diet. Abdominal pain relieved with PRN rectal tube, relief. VSS. Incision LEANDER WDL. Up with A1/GB. Vu patent. C/o 4/10 pain, tolerated with scheduled tylenol. Turn and repo. Pt scoring green on the Aggression Stop Light Tool. Continue to monitor.

## 2020-02-29 NOTE — PLAN OF CARE
PT: PT orders received, initial eval completed and treatment initiated. Patient lives with his aunt, cousin and dad in a w/c accessible home. 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 Pt was diagnosed with L5-S1 spondylolisthesis now s/p L3-S1 PSF, S2-alar pelvic instrumentation, B L4-L5 and L5-S1 TLIF.    Discharge Planner PT   Patient plan for discharge: home  Current status: Instructed pt in spine precautions and log roll technique with bed mobility. Pt requires SBA for bed mobility; Mariusz for transfers and gait x 15' without a device. Impaired balance with standing tasks without UE support, is able to walk without a device but gait is slow and lurching with mild lateral path deviation and pt reaching out for UE support. Instructed pt in correct use of a FWW. Pt requires SBA for transfers and gait x 150' with a FWW with much improved gait pattern. Will continue to assess mobility with and without a device. Provided handout and education regarding proper posture, positioning and body mechanics with functional tasks.  Barriers to return to prior living situation: level of assist required, pain, limited mobility  Recommendations for discharge: home with assist for household tasks that involve lifting  Rationale for recommendations: Anticipate pt will progress to independence with mobility with or without a device prior to hospital disch.       Entered by: Porsche Coffey 02/29/2020 12:10 PM

## 2020-02-29 NOTE — PLAN OF CARE
OT: cx pt educated on role of OT he states he is finally comfortable and in a spot where he is not having pain. Does not want to get up at this time. Educated on importance of activity, pt states he will get up with nursing later

## 2020-02-29 NOTE — PROGRESS NOTES
Luis Eduardo Echevarria is a 45 year old male who was admitted on 2/26/2020. He presented with significant grade 2 lumbar spondylolisthesis at L5-S1. On 2/26/2020 he underwent a L3-S1 PSF and S2 alar iliac pelvic instrumentation as well as bilateral L4-5 and L5-S1 TLIF with Dr. Eason. Today he was seen lying in bed. He reports incisional back pain. He denifes radicular pain, paresthesias, and weakness. He states the Oxycodone he has been taking is making him feel to sleepy.    Incision C/D/I.  Moving all four extremities well.         POD# 3 s/p L3-S2 alar iliac PSF    PLAN:  Changed pain medication to Vicodin.  Continue PT/OT  X-ray's today.    DONIS Rolle  [facility] Neurosurgery  [address] [address]    Tel [phone]  Pager [phone]

## 2020-03-01 ENCOUNTER — APPOINTMENT (OUTPATIENT)
Dept: PHYSICAL THERAPY | Facility: CLINIC | Age: 45
End: 2020-03-01
Attending: NEUROLOGICAL SURGERY
Payer: COMMERCIAL

## 2020-03-01 ENCOUNTER — APPOINTMENT (OUTPATIENT)
Dept: OCCUPATIONAL THERAPY | Facility: CLINIC | Age: 45
End: 2020-03-01
Attending: PHYSICIAN ASSISTANT
Payer: COMMERCIAL

## 2020-03-01 PROCEDURE — 12000000 ZZH R&B MED SURG/OB

## 2020-03-01 PROCEDURE — 97165 OT EVAL LOW COMPLEX 30 MIN: CPT | Mod: GO | Performed by: OCCUPATIONAL THERAPIST

## 2020-03-01 PROCEDURE — 97535 SELF CARE MNGMENT TRAINING: CPT | Mod: GO | Performed by: OCCUPATIONAL THERAPIST

## 2020-03-01 PROCEDURE — 97530 THERAPEUTIC ACTIVITIES: CPT | Mod: GP

## 2020-03-01 PROCEDURE — 97116 GAIT TRAINING THERAPY: CPT | Mod: GP

## 2020-03-01 PROCEDURE — 25000132 ZZH RX MED GY IP 250 OP 250 PS 637: Performed by: PHYSICIAN ASSISTANT

## 2020-03-01 RX ORDER — HYDROCODONE BITARTRATE AND ACETAMINOPHEN 5; 325 MG/1; MG/1
1-2 TABLET ORAL EVERY 4 HOURS PRN
Qty: 50 TABLET | Refills: 0 | Status: SHIPPED | OUTPATIENT
Start: 2020-03-01 | End: 2020-03-11

## 2020-03-01 RX ORDER — AMOXICILLIN 250 MG
1 CAPSULE ORAL 2 TIMES DAILY PRN
Qty: 50 TABLET | Refills: 0 | Status: SHIPPED | OUTPATIENT
Start: 2020-03-01 | End: 2020-04-06

## 2020-03-01 RX ORDER — METHOCARBAMOL 750 MG/1
750 TABLET, FILM COATED ORAL EVERY 6 HOURS PRN
Qty: 50 TABLET | Refills: 0 | Status: SHIPPED | OUTPATIENT
Start: 2020-03-01 | End: 2020-03-17

## 2020-03-01 RX ADMIN — HYDROCODONE BITARTRATE AND ACETAMINOPHEN 2 TABLET: 5; 325 TABLET ORAL at 17:50

## 2020-03-01 RX ADMIN — SENNOSIDES AND DOCUSATE SODIUM 2 TABLET: 8.6; 5 TABLET ORAL at 08:24

## 2020-03-01 RX ADMIN — HYDROCODONE BITARTRATE AND ACETAMINOPHEN 2 TABLET: 5; 325 TABLET ORAL at 01:35

## 2020-03-01 RX ADMIN — HYDROCODONE BITARTRATE AND ACETAMINOPHEN 2 TABLET: 5; 325 TABLET ORAL at 22:00

## 2020-03-01 RX ADMIN — HYDROCODONE BITARTRATE AND ACETAMINOPHEN 2 TABLET: 5; 325 TABLET ORAL at 05:41

## 2020-03-01 RX ADMIN — HYDROCODONE BITARTRATE AND ACETAMINOPHEN 2 TABLET: 5; 325 TABLET ORAL at 13:36

## 2020-03-01 RX ADMIN — HYDROCODONE BITARTRATE AND ACETAMINOPHEN 2 TABLET: 5; 325 TABLET ORAL at 09:37

## 2020-03-01 ASSESSMENT — ACTIVITIES OF DAILY LIVING (ADL)
ADLS_ACUITY_SCORE: 12

## 2020-03-01 ASSESSMENT — MIFFLIN-ST. JEOR: SCORE: 1564.39

## 2020-03-01 NOTE — PLAN OF CARE
POD 4 from a L4-S1 fusion. A&O. CMS intact. Bowel sounds active, Positive flatus, tolerating Reg diet. VSS. Dressing CDI, LEANDER. Up with Ax1 w, gb. C/o 4/10 pain, decreased with Norco. Mild to moderate urine retention noted. PVR's up in the 200's.  Pt scoring green on the Aggression Stop Light Tool. Possible discharge Monday.

## 2020-03-01 NOTE — PROGRESS NOTES
Luis Eduardo Echevarria is a 45 year old male who was admitted on 2/26/2020. He presented with significant grade 2 lumbar spondylolisthesis at L5-S1. On 2/26/2020 he underwent a L3-S1 PSF and S2 alar iliac pelvic instrumentation as well as bilateral L4-5 and L5-S1 TLIF with Dr. Eason. Today he was seen lying in bed. He reports incisional back pain. He denifes radicular pain, paresthesias, and weakness. He states the Oxycodone he has been taking is making him feel to sleepy.     He's sitting up in a chair with brace.  Incision C/D/I.  Moving all four extremities well.     Post op x-ray's completed yesterday and reveal expected post operative changes.      POD# 4 s/p L3-S2 alar iliac PSF     PLAN:  PT/OT recommending home with assist.  Patient requests to stay today and discharge tomorrow.  Discharge and prescriptions completed.     DONIS Rolle  [facility] Neurosurgery  [address] [address]     Tel [phone]  Pager [phone]

## 2020-03-01 NOTE — PROGRESS NOTES
03/01/20 1057   Quick Adds   Type of Visit Initial Occupational Therapy Evaluation   Living Environment   Lives With other (see comments)   Living Arrangements house   Living Environment Comment bedroom basement, walk in shower with grab bars and chair.    Self-Care   Usual Activity Tolerance good   Current Activity Tolerance moderate   Regular Exercise No   General Information   Onset of Illness/Injury or Date of Surgery - Date 02/26/20   Referring Physician Fifi   Patient/Family Goals Statement to go home   Additional Occupational Profile Info/Pertinent History of Current Problem 45 YOM diagnosed with L5-S1 spondylolisthesis no ws/p L3-S1 PSF, S2-alar pelvic instrumentation, B L4-L5 and L5-S1 TLIF.   Precautions/Limitations spinal precautions   General Observations pt agreeable to OT eval   Cognitive Status Examination   Orientation orientation to person, place and time   Level of Consciousness alert   Follows Commands (Cognition) WNL   Memory intact   Attention No deficits were identified   Organization/Problem Solving No deficits were identified   Visual Perception   Visual Perception No deficits were identified;Wears glasses   Visual Perception Comments denies blurry or double vision   Sensory Examination   Sensory Comments denies blurry or double vision   Pain Assessment   Patient Currently in Pain No   Integumentary/Edema   Integumentary/Edema no deficits were identifed   Range of Motion (ROM)   ROM Comment WFL B UE   Strength   Strength Comments not formally tested due to spinal precautions. but functional with activity   Muscle Tone Assessment   Muscle Tone Quick Adds No deficits were identified   Transfer Skill: Bed to Chair/Chair to Bed   Level of [place]: Bed to Chair stand-by assist   Physical Assist/Nonphysical Assist: Bed to Chair supervision   Assistive Device - Transfer Skill Bed to Chair Chair to Bed Rehab Eval rolling walker   Transfer Skill: Sit to Stand   Level of [place]:  "Sit/Stand stand-by assist   Physical Assist/Nonphysical Assist: Sit/Stand supervision   Transfer Skill: Toilet Transfer   Level of [place]: Toilet stand-by assist   Toilet Transfer Skill Comments per patient report   Lower Body Dressing   Level of [place]: Dress Lower Body moderate assist (50% patients effort)   Physical Assist/Nonphysical Assist: Dress Lower Body 1 person assist   Grooming   Level of [place]: Grooming stand-by assist   Physical Assist/Nonphysical Assist: Grooming supervision   Eating/Self Feeding   Level of [place]: Eating independent   Instrumental Activities of Daily Living (IADL)   Previous Responsibilities meal prep;work;driving;medication management;finances;shopping;laundry   Activities of Daily Living Analysis   Impairments Contributing to Impaired Activities of Daily Living flexibility decreased;pain;strength decreased   General Therapy Interventions   Planned Therapy Interventions ADL retraining;transfer training;progressive activity/exercise;home program guidelines   Clinical Impression   Criteria for Skilled Therapeutic Interventions Met yes, treatment indicated   OT Diagnosis decreased I with ADL's and functional mobiltiy   Influenced by the following impairments decreased flexibility and surgical precautions   Assessment of Occupational Performance 1-3 Performance Deficits   Identified Performance Deficits decreased dressing, home mgmt   Clinical Decision Making (Complexity) Low complexity   Therapy Frequency Daily   Predicted Duration of Therapy Intervention (days/wks) 2 days   Anticipated Equipment Needs at Discharge dressing equipment   Anticipated Discharge Disposition Home with Assist   Risks and Benefits of Treatment have been explained. Yes   Patient, Family & other staff in agreement with plan of care Yes   [facility] [employer] TM \"6 Clicks\"   2016, Trustees of [facility], under license to [employer].  All rights reserved.   6 Clicks Short Forms " "Daily Activity Inpatient Short Form   [facility] [employer]  \"6 Clicks\" Daily Activity Inpatient Short Form   1. Putting on and taking off regular lower body clothing? 2 - A Lot   2. Bathing (including washing, rinsing, drying)? 3 - A Little   3. Toileting, which includes using toilet, bedpan or urinal? 3 - A Little   4. Putting on and taking off regular upper body clothing? 4 - None   5. Taking care of personal grooming such as brushing teeth? 4 - None   6. Eating meals? 4 - None   Daily Activity Raw Score (Score out of 24.Lower scores equate to lower levels of function) 20   Total Evaluation Time   Total Evaluation Time (Minutes) 10     "

## 2020-03-01 NOTE — PLAN OF CARE
Discharge Planner OT   Patient plan for discharge: home with assist from family for IADL's  Current status: eval completed and treatment initiated. Pt lives with his dad, brother, aunt and cousin. He has a basement bedroom and there is a chair lift available that was his grandmas. Has a walk in shower with grab bars and a handicap height toilet. 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 Pt currently able to demo mod I with bed mobility and spinal precautions. After education of AE able to demo mod I with LE dressing. Pt reports feeling comfortable with toilet transfer, has done several times with nursing.   Barriers to return to prior living situation: none anticipated  Recommendations for discharge: home with assist for cooking, cleaning, laundry as needed until back to baseline  Rationale for recommendations: pt has met all OT goals. Will complete orders.   Occupational Therapy Discharge Summary    Reason for therapy discharge:    Discharged to home.    Progress towards therapy goal(s). See goals on Care Plan in [facility] electronic health record for goal details.  Goals met    Therapy recommendation(s):    No further therapy is recommended.           Entered by: Carolyn Galvan 03/01/2020 11:35 AM

## 2020-03-01 NOTE — PLAN OF CARE
POD 4 from a L4-S1 fusion. A&O. CMS intact. Bowel sounds +x4, + flatus, tolerating regular diet. VSS. Dressing WDL with liquid bandage. Up with A1, GB, W. C/o moderate pain, decreased with PRN Norco. Pt scoring green on the Aggression Stop Light Tool. Voiding - , refusing catheter, will reattempt in 1 hr. Discharge home pending.

## 2020-03-01 NOTE — PLAN OF CARE
Discharge Planner PT   Patient plan for discharge: home  Current status: Pt able to recall 3/3 spine precautions. Demonstrates modified independence with rolling and bd mobility with log roll technique. Able to correctly rashid LS corset once positioned behind him. Pt requires SBA for sit<>stand, bed<>chair transfers with a FWW. Gait training x 200' with a FWW and SBA.  Much improved quality and fluidity of gait with use of FWW.  Barriers to return to prior living situation: level of assist required, pain  Recommendations for discharge: home with assist for household tasks that involve lifting  Rationale for recommendations: Anticipate pt will progress to modified independence with mobility by hospital disch.       Entered by: Porsche Coffey 03/01/2020 8:45 AM

## 2020-03-02 ENCOUNTER — APPOINTMENT (OUTPATIENT)
Dept: PHYSICAL THERAPY | Facility: CLINIC | Age: 45
End: 2020-03-02
Attending: NEUROLOGICAL SURGERY
Payer: COMMERCIAL

## 2020-03-02 VITALS
OXYGEN SATURATION: 96 % | RESPIRATION RATE: 16 BRPM | HEIGHT: 67 IN | WEIGHT: 160 LBS | DIASTOLIC BLOOD PRESSURE: 82 MMHG | TEMPERATURE: 98 F | HEART RATE: 84 BPM | BODY MASS INDEX: 25.11 KG/M2 | SYSTOLIC BLOOD PRESSURE: 120 MMHG

## 2020-03-02 PROCEDURE — 25000132 ZZH RX MED GY IP 250 OP 250 PS 637: Performed by: PHYSICIAN ASSISTANT

## 2020-03-02 PROCEDURE — 97116 GAIT TRAINING THERAPY: CPT | Mod: GP

## 2020-03-02 PROCEDURE — 97530 THERAPEUTIC ACTIVITIES: CPT | Mod: GP

## 2020-03-02 RX ADMIN — METHOCARBAMOL TABLETS 750 MG: 750 TABLET, COATED ORAL at 04:09

## 2020-03-02 RX ADMIN — HYDROCODONE BITARTRATE AND ACETAMINOPHEN 2 TABLET: 5; 325 TABLET ORAL at 08:29

## 2020-03-02 RX ADMIN — HYDROCODONE BITARTRATE AND ACETAMINOPHEN 2 TABLET: 5; 325 TABLET ORAL at 01:54

## 2020-03-02 RX ADMIN — SENNOSIDES AND DOCUSATE SODIUM 2 TABLET: 8.6; 5 TABLET ORAL at 08:30

## 2020-03-02 ASSESSMENT — ACTIVITIES OF DAILY LIVING (ADL)
ADLS_ACUITY_SCORE: 12

## 2020-03-02 NOTE — PLAN OF CARE
Discharge Planner PT   Patient plan for discharge: home today  Current status: Pt demonstrates modified independence with bed mobility using log roll technique, transfers with a FWW including toileting with pt able to manage clothing and hygiene safely, gait x 200' with a FWW. Pt still requires SBA with impaired gait pattern with ambulating without a device. Instructed pt on how and when to transition off the walker for gait.  Barriers to return to prior living situation: none  Recommendations for discharge: home with family assist for household tasks that involve lifting  Rationale for recommendations: Goals met       Entered by: Porsche Coffey 03/02/2020 10:58 AM     Physical Therapy Discharge Summary    Reason for therapy discharge:    Discharged to home.    Progress towards therapy goal(s). See goals on Care Plan in [facility] electronic health record for goal details.  Goals met    Therapy recommendation(s):    Continue home exercise program. Ambulation for exercise.

## 2020-03-02 NOTE — DISCHARGE INSTRUCTIONS
[facility] at [facility]  Dr. Eason Discharge Instructions Following Spine Surgery                                                                                                                                                          [phone]  Monday - Friday; 8:00 AM - 4:00 PM    In General:   After you have had surgery on your spine, remember do not twist, or excessively flex or extend the area that you had surgery.  These activities can prevent healing.  Pain is normal and to be expected following surgery.  Please call our office to schedule your appointment follow up appointment.      Bowel Care:  Many people have constipation (hard stools) after surgery.  To help prevent constipation: Drink plenty of fluid (8-10 glasses/day); Eat more fiber, such as whole grain bread, bran cereal, and fruits and vegetables; Stay active by walking; Over the counter stool softener may also help.      Medications:  Spine surgery and pain management is unique to all patients.  You will generally be given medications for pain, muscle spasms or tightness, and for constipation during the immediate post op period.  It is important that you use these as prescribed.  Please remember to bring your pill bottles to all of your appointments. Avoid alcoholic beverages while taking narcotic pain medications. You can use ice to areas of pain as needed, 20 minutes at a time.  Changing positions and walking will help loosen your muscles as well.    Driving:  No driving while on narcotic pain medications.  It is state law not to drive while under the influence of a drug to a degree which renders you incapable of safely driving.  The narcotic medication you will be taking after surgery falls under this category.     Activity:   After surgery, most people feel less pain than they have had in a long time.  Walking and light activities will help you regain the use of your muscles.  You are encouraged to walk: start  with short walks 5-10 minutes at a time for 4-5 times per day and increase as tolerated.  Stair climbing as tolerated, we recommend you use the railing. No lifting greater than 10 pounds: approximately equal to one gallon of milk. No twisting, bending in the area you have had surgery. No housework, vacuuming, laundry, leaf raking, lawn mowing, or snow removal. Wear your brace (if ordered) as directed.    Showers:  If you have sutures or staples you may shower two days after surgery. It is ok to let water run over your incision but do not touch or scrub on the incision. Pat dry immediately after showering. If there is a dressing in place, you may remove it 2 days after surgery. If you were closed with Derma leblanc (glue), you may shower without covering the incision. No baths, hot tubs, or pool activity for at least 6 weeks.     Nutrition:  In general, your diet restrictions will not change with your surgery.  You may need to eat small frequent meals initially until your appetite returns.  Eat plenty of high fiber foods and drink plenty of fluids. If you do not have a fluid restriction from or prior to surgery, we recommend 6-8 (8oz) glasses of water per day. Other fluids are fine, but water is best. Nausea is not uncommon; it is a common side effect to many pain medications.  We recommend that you take the pain medications with food, if this does not improve your symptoms, please call us.     Smoking:  For proper healing it is required that you quit using all tobacco products.  This includes smoking, chewing, nicotine gums, and nicotine patches.  Call Dr. Eason if these occur: Drainage from your incision, increased pain/redness/swelling, temperatures greater than 101.5, increased leg pain or swelling or unrelieved headaches    Go to the nearest Emergency Room if you experience: chest pain, shortness of breath, neck swelling or swallowing problems

## 2020-03-02 NOTE — PROGRESS NOTES
SPIRITUAL HEALTH SERVICES Progress Note  [facility] 701[address]01    Visited pt per LOS. Pt said that he is going home today, pt's aunt has the wheelchair accessible bathroom and stairs. Pt is appreciate the stop by, no need from [facility] to follow up at this time.      David Alberto  Chaplain Resident

## 2020-03-02 NOTE — PLAN OF CARE
Patient has been discharged on 3/2/2020 at 1140. Discharge instructions and education reviewed, medication schedule and follow up appt discussed. Pt has no questions. All belongings sent with pt. Pt transported home via aunt.

## 2020-03-02 NOTE — PLAN OF CARE
Patient POD#4 L4-S1 PSF.  CMS intact, denies tingling on top of left foot which was present previously, BLE 4/5, stronger when sitting in chair.  Voiding in bathroom, PVR's WDL (no need for cath unless PVR >500 per Kary NP) passing flatus, no BM, no nausea.  Incision approximated with liquid bandage, ecchymotic. Plan for discharge to home tomorrow AM prior to 1100.  Discharge meds in cabinet, will need walker issued and neurosurgery to round prior to discharge.

## 2020-03-02 NOTE — PLAN OF CARE
POD5 from a L3-S1 posterior fusion. A&O x4. Denies tingling to left foot, CMS intact. Slight weakness in BLE when lying in bed. Bowel sounds active, passing flatus. No BM this shift, declined scheduled Senna. Tolerating regular diet. VSS. Incision approximated, liquid bandage. Up with SBA w/ GB/walker/brace. Able to place brace on appropriately w/o staff assist. Reporting pain 3/10 to incisional site, radiating in lower back laterally. Decreased with PRN Norco, cold packs, and repositioning; PRN Robaxin given 1x. Plan to discharge home in AM, before 1100, aunt to transport. Plan for neurosurg to round in AM before discharge. Nsg to continue monitoring.

## 2020-03-02 NOTE — DISCHARGE SUMMARY
[facility]    Discharge Summary  Neurosurgery    Date of Admission:  2/26/2020  Date of Discharge:  3/2/2020  Discharging Provider: Tamara Dykes  Date of Service (when I saw the patient): 03/02/20    Discharge Diagnoses   Active Problems:    History of lumbar fusion      History of Present Illness  Luis Eduardo Echevarria is a 45 year old male who was admitted on 2/26/2020. He presented with significant grade 2 lumbar spondylolisthesis at L5-S1. On 2/26/2020 he underwent a L3-S1 PSF and S2 alar iliac pelvic instrumentation as well as bilateral L4-5 and L5-S1 TLIF with Dr. Eason. Postoperatively he noted low back pain, but denied radicular pain. He noted bilateral foot paresthesias at baseline. No weakness. He worked with therapies who recommended home. His pain was controlled on oral pain medicine. He was voiding appropriately. He was discharged home.     Hospital Course   Luis Eduardo Echevarria was admitted on 2/26/2020.  The following problems were addressed during his hospitalization:    Active Problems:    History of lumbar fusion    Assessment: s/p L3-S1 PSF with S2 alar iliac pelvic instrumentaton and bilateral L4-5 and L5-S1 TLIF    Plan:   Discharge to home with routine follow up in 2 weeks, 6 weeks, and 12 weeks.      I have discussed the following assessment and plan with Dr. aEson who is in agreement with initial plan and will follow up with further consultation recommendations.    Tamara Dykes, CNP  [facility]  [place] [address]    Tel [phone]  Pager [phone]        Code Status   Full Code    Primary Care Physician   Nadeem Joseph    Physical Exam   Temp: 98  F (36.7  C) Temp src: Oral BP: 120/82 Pulse: 84 Heart Rate: 89 Resp: 16 SpO2: 96 % O2 Device: None (Room air)    Vitals:    02/26/20 0958 03/01/20 0227   Weight: 159 lb (72.1 kg) 160 lb (72.6 kg)     Vital Signs with Ranges  Temp:  [97.9  F (36.6   C)-98.4  F (36.9  C)] 98  F (36.7  C)  Pulse:  [84] 84  Heart Rate:  [81-95] 89  Resp:  [14-16] 16  BP: (112-120)/(73-83) 120/82  SpO2:  [96 %-99 %] 96 %  I/O last 3 completed shifts:  In: 120 [P.O.:120]  Out: -     Constitutional: Awake, alert, cooperative, no apparent distress, and appears stated age.  Eyes: Lids and lashes normal, pupils equal, round and reactive to light, extra ocular muscles intact  ENT: Normocephalic, without obvious abnormality, atraumatic  Respiratory: No increased work of breathing  Skin: No bruising or bleeding, normal skin color, texture, turgor, no redness, warmth, or swelling.  Incision with staples intact, minimal drainage, open to air.  Musculoskeletal: There is no redness, warmth, or swelling of the joints.  Full range of motion noted.  Motor strength is 5 out of 5 all extremities bilaterally.  Tone is normal.  Neurologic: Awake, alert, oriented to name, place and time.  Cranial nerves II-XII are grossly intact.  Motor is 5 out of 5 bilaterally.     Neuropsychiatric: Calm, normal eye contact, alert, normal affect, oriented to self, place, time and situation, memory for past and recent events intact and thought process normal.       Time Spent on this Encounter   I, Tamara Dykes NP, personally saw the patient today and spent less than 30 minutes discharging this patient.    Discharge Disposition   Discharged to home  Condition at discharge: Stable    Consultations This Hospital Stay   ORTHOSIS SPINAL IP CONSULT  PHYSICAL THERAPY ADULT IP CONSULT  OCCUPATIONAL THERAPY ADULT IP CONSULT    Discharge Orders      Reason for your hospital stay    Post op lumbar fusion     Follow-up and recommended labs and tests     Follow up in NS nurse clinic 3/11/20  Follow up in NS clinic 4/8/20 and 5/2/20     Activity    No lifting greater than 5-10 lbs     Wound care and dressings    Okay to shower and get incision wet, no soaking in a tub     Walker Order    DME Documentation:   Describe  the reason for need to support medical necessity: Impaired gait.     I, the undersigned, certify that the above prescribed supplies are medically necessary for this patient and is both reasonable and necessary in reference to accepted standards of medical and necessary in reference to accepted standards of medical practice in the treatment of this patient's condition and is not prescribed as a convenience.     Diet    Resume a regular diet     Discharge Medications   Current Discharge Medication List      START taking these medications    Details   HYDROcodone-acetaminophen (NORCO) 5-325 MG tablet Take 1-2 tablets by mouth every 4 hours as needed for moderate to severe pain  Qty: 50 tablet, Refills: 0    Associated Diagnoses: History of lumbar fusion      methocarbamol (ROBAXIN) 750 MG tablet Take 1 tablet (750 mg) by mouth every 6 hours as needed for muscle spasms  Qty: 50 tablet, Refills: 0    Associated Diagnoses: History of lumbar fusion      senna-docusate (SENOKOT-S/PERICOLACE) 8.6-50 MG tablet Take 1 tablet by mouth 2 times daily as needed for constipation  Qty: 50 tablet, Refills: 0    Associated Diagnoses: History of lumbar fusion         STOP taking these medications       acetaminophen (TYLENOL) 500 MG tablet Comments:   Reason for Stopping:         ibuprofen (ADVIL/MOTRIN) 200 MG tablet Comments:   Reason for Stopping:         naproxen (NAPROSYN) 500 MG tablet Comments:   Reason for Stopping:             Allergies   No Known Allergies

## 2020-03-03 ENCOUNTER — TELEPHONE (OUTPATIENT)
Dept: FAMILY MEDICINE | Facility: CLINIC | Age: 45
End: 2020-03-03

## 2020-03-03 NOTE — TELEPHONE ENCOUNTER
ED / Discharge Outreach Protocol    Patient Contact    Pt is following up with surgeon office per recommendations    Tona Parrish RN, BSN

## 2020-03-03 NOTE — TELEPHONE ENCOUNTER
03/02/2020 Isthmic Spondylolisthesis, Spinal Stenosis Of Lumbar Region With Radiculopathy, History Of Lumbar Fusion  No future appt  Roseanna Rodriguez

## 2020-03-10 ENCOUNTER — PATIENT OUTREACH (OUTPATIENT)
Dept: CARE COORDINATION | Facility: CLINIC | Age: 45
End: 2020-03-10

## 2020-03-10 ASSESSMENT — ACTIVITIES OF DAILY LIVING (ADL): DEPENDENT_IADLS:: INDEPENDENT

## 2020-03-10 NOTE — PROGRESS NOTES
Clinic Care Coordination Contact  [facility]/Voicemail    Referral Source: PCP  Clinical Data: Care Coordinator Outreach  Outreach attempted x 1.  Left message on patient's voicemail with call back information and requested return call.  Plan: Care Coordinator will send unable to contact letter with care coordinator contact information via mail. Care Coordinator will try to reach patient again in 10 business days.      FELY Nesbitt   Care Coordination Team  [phone]

## 2020-03-11 ENCOUNTER — OFFICE VISIT (OUTPATIENT)
Dept: NEUROSURGERY | Facility: CLINIC | Age: 45
End: 2020-03-11
Payer: COMMERCIAL

## 2020-03-11 VITALS
DIASTOLIC BLOOD PRESSURE: 96 MMHG | HEIGHT: 67 IN | RESPIRATION RATE: 16 BRPM | BODY MASS INDEX: 25.11 KG/M2 | WEIGHT: 160 LBS | HEART RATE: 88 BPM | OXYGEN SATURATION: 99 % | SYSTOLIC BLOOD PRESSURE: 146 MMHG | TEMPERATURE: 98 F

## 2020-03-11 DIAGNOSIS — Z98.1 HISTORY OF LUMBAR FUSION: ICD-10-CM

## 2020-03-11 DIAGNOSIS — Z98.1 S/P LUMBAR FUSION: Primary | ICD-10-CM

## 2020-03-11 PROCEDURE — 99207 ZZC NO CHARGE NURSE ONLY: CPT

## 2020-03-11 RX ORDER — HYDROCODONE BITARTRATE AND ACETAMINOPHEN 5; 325 MG/1; MG/1
1-2 TABLET ORAL EVERY 4 HOURS PRN
Qty: 40 TABLET | Refills: 0 | Status: SHIPPED | OUTPATIENT
Start: 2020-03-11 | End: 2020-03-17

## 2020-03-11 ASSESSMENT — PAIN SCALES - GENERAL: PAINLEVEL: MODERATE PAIN (4)

## 2020-03-11 ASSESSMENT — MIFFLIN-ST. JEOR: SCORE: 1564.39

## 2020-03-11 NOTE — PROGRESS NOTES
Nurse Suture/Staple removal visit note:  Pain  Patient presents today s/p L3-S1 fusion per the order of Dr. Eason. Pain rates 4/10 to low back and bilateral hips. Patient taking Ibuprofen TID. Provided patient with refill today.  Incision  Wound is healing nicely without erythema, fluctuation, induration, drainage, or fever. Incision edges well approximated without signs of infection. Staples removed by writer. Steri-strips applied. Patient tolerated with minimal discomfort.  Assessment/Patient Questions    Denies numbness/tingling to bilateral lower extremities. Denies weakness. Able to dorsi-flex and plantar flex with equal strength. Left dorsal foot and 2nd-4th toe numbness  All questions answered.  Instructions for Patient    Keep your incision clean and dry at all times.     No bathing, swimming, or submerging in water until incision is well healed.      No lifting greater than 10-15 pounds. No bending, twisting, or overhead reaching.    Continue to wear brace as directed    Return in 4 weeks for follow up appointment and xray    Weaning from narcotic pain medications: When it is time, start weaning by extending the time between doses. For example, if you're taking 2 tabs every 4 hours, spread it out to 2 tabs over 4.5, 5, 6 hours. At that point you can certainly cut down to 1 tab, then wean to an as needed basis until completely done with them.     For refills, please call our clinic. A nurse will call you back to obtain a pain assessment. Please call 1-2 business days before you run out so we can ensure there is a provider available at the location you prefer for .    Call the clinic or go to Emergency Room if you develop any new pain, drainage, swelling, or fever. Go to the Emergency Room if sudden onset of severe headache, weakness, confusion, change in level of consciousness, pain, or loss of movement.    Post-operative appointments: Arrive 30 minutes before your 6 week post op, 3 months post op, 6  months post op, 1 year post-op appointments to allow time for an x-ray before each.    Please call clinic with any further questions or concerns    JOANNE Henderson  [facility] Neurosurgery   [address] [place] [address]  T:  [phone]  F:  [phone]

## 2020-03-11 NOTE — NURSING NOTE
"Luis Eduardo Echevarria is a 45 year old male who presents for:  Chief Complaint   Patient presents with     Surgical Followup        Initial Vitals:  BP (!) 146/96   Pulse 88   Temp 98  F (36.7  C) (Oral)   Resp 16   Ht 5' 7\" (1.702 m)   Wt 160 lb (72.6 kg)   SpO2 99%   BMI 25.06 kg/m   Estimated body mass index is 25.06 kg/m  as calculated from the following:    Height as of this encounter: 5' 7\" (1.702 m).    Weight as of this encounter: 160 lb (72.6 kg).. Body surface area is 1.85 meters squared. BP completed using cuff size: regular  Moderate Pain (4)    Nursing Comments: Pt present today for post op follow up.    Vincent Disla, [facility]    "

## 2020-03-11 NOTE — PATIENT INSTRUCTIONS
Instructions for Patient    Keep your incision clean and dry at all times.     No bathing, swimming, or submerging in water until incision is well healed.      No lifting greater than 10-15 pounds. No bending, twisting, or overhead reaching.    Continue to wear brace as directed    Return in 4 weeks for follow up appointment and xray    Weaning from narcotic pain medications: When it is time, start weaning by extending the time between doses. For example, if you're taking 2 tabs every 4 hours, spread it out to 2 tabs over 4.5, 5, 6 hours. At that point you can certainly cut down to 1 tab, then wean to an as needed basis until completely done with them.     For refills, please call our clinic. A nurse will call you back to obtain a pain assessment. Please call 1-2 business days before you run out so we can ensure there is a provider available at the location you prefer for .    Call the clinic or go to Emergency Room if you develop any new pain, drainage, swelling, or fever. Go to the Emergency Room if sudden onset of severe headache, weakness, confusion, change in level of consciousness, pain, or loss of movement.    Post-operative appointments: Arrive 30 minutes before your 6 week post op, 3 months post op, 6 months post op, 1 year post-op appointments to allow time for an x-ray before each.    Please call clinic with any further questions or concerns    JOANNE Henderson  [facility] Neurosurgery   [address] [address]  T:  [phone]  F:  [phone]

## 2020-03-17 ENCOUNTER — MYC REFILL (OUTPATIENT)
Dept: NEUROSURGERY | Facility: CLINIC | Age: 45
End: 2020-03-17

## 2020-03-17 DIAGNOSIS — Z98.1 HISTORY OF LUMBAR FUSION: ICD-10-CM

## 2020-03-17 DIAGNOSIS — Z98.1 S/P LUMBAR FUSION: Primary | ICD-10-CM

## 2020-03-17 RX ORDER — HYDROCODONE BITARTRATE AND ACETAMINOPHEN 5; 325 MG/1; MG/1
1-2 TABLET ORAL EVERY 4 HOURS PRN
Qty: 40 TABLET | Refills: 0 | Status: CANCELLED | OUTPATIENT
Start: 2020-03-17

## 2020-03-17 RX ORDER — HYDROCODONE BITARTRATE AND ACETAMINOPHEN 5; 325 MG/1; MG/1
1-2 TABLET ORAL EVERY 4 HOURS PRN
Qty: 40 TABLET | Refills: 0 | Status: SHIPPED | OUTPATIENT
Start: 2020-03-17 | End: 2020-03-24

## 2020-03-17 NOTE — TELEPHONE ENCOUNTER
Called and LVM for patient to return call or send [employer] message to discuss refill.     Prescription Refill Request    Medication: Norco    Surgical procedure/date: L3-S1 fusion on 2/26    Current regimen/number of tabs per day:     Last refill: 3/11    Pain Assessment:      location:     Any patient questions or concerns: None     Will forward request to care team for approval.

## 2020-03-17 NOTE — TELEPHONE ENCOUNTER
Prescription Refill Request    Medication: Norco 5/325 mg    Surgical procedure/date: L3-S1 posterior fusion with Dr Eason 2/26/20    Current regimen/number of tabs per day: 2 every 4 hours . Ran out on Sunday.     Last refill:  3/11/20 #40    Pain Assessment:  Achy pain in bilateral legs, stemming from low back, to knees, no pain below knees. Tight muscle in low back. Advised patient ice at incision site. May alternate ice and heat elsewhere. Advised to not take NSAIDs for 6 weeks. He verbalized understanding.      location: [facility]     Any patient questions or concerns: Robaxin not working. Discussed switching to Tizanidine 4 mg TID and ok per Tio Gibbons PA-C.     Will forward request to care team for approval.

## 2020-03-24 ENCOUNTER — MYC REFILL (OUTPATIENT)
Dept: NEUROSURGERY | Facility: CLINIC | Age: 45
End: 2020-03-24

## 2020-03-24 DIAGNOSIS — Z98.1 S/P LUMBAR FUSION: ICD-10-CM

## 2020-03-25 RX ORDER — HYDROCODONE BITARTRATE AND ACETAMINOPHEN 5; 325 MG/1; MG/1
1-2 TABLET ORAL EVERY 4 HOURS PRN
Qty: 40 TABLET | Refills: 0 | Status: SHIPPED | OUTPATIENT
Start: 2020-03-25 | End: 2020-04-06

## 2020-03-30 ENCOUNTER — PATIENT OUTREACH (OUTPATIENT)
Dept: CARE COORDINATION | Facility: CLINIC | Age: 45
End: 2020-03-30

## 2020-03-30 ASSESSMENT — ACTIVITIES OF DAILY LIVING (ADL): DEPENDENT_IADLS:: INDEPENDENT

## 2020-03-30 NOTE — LETTER
[place] CARE COORDINATION  [facility]  March 30, 2020    Luis Eduardo Echevarria  [address] [address]      Dear Luis Eduardo,    I am a clinic care coordinator who works with Nadeem Joseph MD at [facility].  I recently tried to call and was unable to reach you.        Please feel free to contact me, at [phone] with any questions or concerns. We are focused on providing you with the highest-quality healthcare experience possible and that all starts with you.     Sincerely,     FELY Nesbitt   Care Coordination Team  [phone]

## 2020-03-30 NOTE — LETTER
[place] CARE COORDINATION  [facility]   April 17, 2020    Luis Eduardo Echevarria  [address] [place] [address]      Dear Luis Eduardo,    I have been unsuccessful in reaching you since our last contact. At this time the Care Coordination team will make no further attempts to reach you, however this does not change your ability to continue receiving care from your providers at your primary care clinic. If you need additional support from a care coordinator in the future please contact me at [phone].    All of us at [facility] are invested in your health and are here to assist you in meeting your goals.     Sincerely,    FELY Nesbitt   Care Coordination Team  [phone]

## 2020-03-30 NOTE — PROGRESS NOTES
Clinic Care Coordination Contact  [facility]/Voicemail  Late entry on 4/17/20   Referral Source: PCP  Clinical Data: Care Coordinator Outreach  Outreach attempted x 2.  Left message on patient's voicemail with call back information and requested return call.  Plan: Care Coordinator will send dis-enrollment letter with care coordinator contact information via [employer]. Care Coordinator will make no further outreach attempts at this time..      FELY Nesbitt   Care Coordination Team  [phone]

## 2020-04-06 ENCOUNTER — VIRTUAL VISIT (OUTPATIENT)
Dept: NEUROSURGERY | Facility: CLINIC | Age: 45
End: 2020-04-06
Attending: NEUROLOGICAL SURGERY
Payer: COMMERCIAL

## 2020-04-06 DIAGNOSIS — Z98.1 S/P LUMBAR FUSION: Primary | ICD-10-CM

## 2020-04-06 RX ORDER — HYDROCODONE BITARTRATE AND ACETAMINOPHEN 5; 325 MG/1; MG/1
1-2 TABLET ORAL EVERY 6 HOURS PRN
Qty: 20 TABLET | Refills: 0 | Status: SHIPPED | OUTPATIENT
Start: 2020-04-06 | End: 2022-03-01

## 2020-04-06 NOTE — PROGRESS NOTES
"Luis Eduardo Echevarria is a 45 year old male who is being evaluated via a billable telephone visit.      Due to COVID-19 this visit has been performed over the phone verses face to face.     The patient has been notified of following:     \"This telephone visit will be conducted via a call between you and your physician/provider. We have found that certain health care needs can be provided without the need for a physical exam.  This service lets us provide the care you need with a short phone conversation.  If a prescription is necessary we can send it directly to your pharmacy.  If lab work is needed we can place an order for that and you can then stop by our lab to have the test done at a later time.    If during the course of the call the physician/provider feels a telephone visit is not appropriate, you will not be charged for this service.\"     Luis Eduardo Echevarria complains of    Chief Complaint   Patient presents with     Surgical Followup       I have reviewed and updated the patient's Past Medical History, Social History, Family History and Medication List.    ALLERGIES  Patient has no known allergies.    Additional provider notes:    Luis Eduardo Echevarria is a 45 year old male who is 6 weeks status post L3-S1 posterior spinal fusion.  Patient states he is doing better than before surgery.  He generally feels things are moving in the right direction.  He has some pain particularly in the mornings, and some stiffness and walking.  But overall is feeling improved, he does report occasional low back pain.  Denies any issues with his incisions.  He is wondering how to increase his mobility moving forward.    Assessment    Status post L3-S1 posterior spinal fusion, S2 alar iliac pelvic instrumentation    Plan:    I recommend the patient begin walking more at this point time I would hold off on any extensive range of motion exercises or heavy lifting.  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  I provided a short prescription for Percocet sent to [facility] where he is currently residing.    Phone call duration: 5 minutes  Start Time 950  End Time 734    Sanya Mathis PA-C    Patient provided verbal consent for the phone visit today.

## 2020-04-07 ENCOUNTER — MYC MEDICAL ADVICE (OUTPATIENT)
Dept: FAMILY MEDICINE | Facility: CLINIC | Age: 45
End: 2020-04-07

## 2020-05-18 ENCOUNTER — ANCILLARY PROCEDURE (OUTPATIENT)
Dept: GENERAL RADIOLOGY | Facility: CLINIC | Age: 45
End: 2020-05-18
Payer: COMMERCIAL

## 2020-05-18 ENCOUNTER — VIRTUAL VISIT (OUTPATIENT)
Dept: NEUROSURGERY | Facility: CLINIC | Age: 45
End: 2020-05-18
Attending: NEUROLOGICAL SURGERY
Payer: COMMERCIAL

## 2020-05-18 DIAGNOSIS — Z98.1 S/P LUMBAR FUSION: ICD-10-CM

## 2020-05-18 DIAGNOSIS — Z98.1 S/P LUMBAR SPINAL FUSION: Primary | ICD-10-CM

## 2020-05-18 PROCEDURE — 72100 X-RAY EXAM L-S SPINE 2/3 VWS: CPT

## 2020-05-18 PROCEDURE — 99024 POSTOP FOLLOW-UP VISIT: CPT | Performed by: NEUROLOGICAL SURGERY

## 2020-05-18 NOTE — LETTER
5/18/2020         RE: Luis Eduardo Echevarria  [address] [place] [address]        Dear Colleague,    Thank you for referring your patient, Luis Eduardo Echevarria, to the [facility]. Please see a copy of my visit note below.    Reason for Visit: Due to COVID-19 precautions, this visit was performed over the phone instead of face to face.    It was a pleasure to talk with Luis Eduardo Echevarria today by phone. He is a 45 year old male who underwent:    Procedure Date: 02/26/2020      PREOPERATIVE DIAGNOSIS:  L5 to S1 isthmic spondylolisthesis with stenosis and radiculopathy.      POSTOPERATIVE DIAGNOSIS:  L5 to S1 isthmic spondylolisthesis with stenosis and radiculopathy.      PROCEDURES:   1.  L3 to S1 posterior segmental instrumentation.   2.  S2 alar iliac pelvic instrumentation.   3.  Bilateral L4 to 5 and L5 to S1 transforaminal lumbar interbody fusion and Leone-Morocho osteotomies.   4.  Posterior arthrodesis L3 to S1.      SURGEON:  Dimitri Eason MD      ASSISTANT:  Fausto Gibbons PA-C      ANESTHESIA:  General endotracheal anesthesia.      ESTIMATED BLOOD LOSS:  1100 mL.     Feels Ok. Pain getting a lot better. Mowed lawn. Middle three toes tingling at times on left foot.     Not sure what he is going to do for work. 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    Plan: PT at [facility]. 25# lifting restriction may be increased as tolerated. We will see him back at 1 year postop with a  CT of the lumbar spine. We also discussed work hardening in the future if needed.      This phone visit took 7 minutes.        Again, thank you for allowing me to participate in the care of your patient.        Sincerely,        Dimitri Eason MD

## 2020-05-18 NOTE — LETTER
[facility]  Neurosurgery Clinic  [address] [place] [address]  [address] [place] [address]  [id number]        5/18/2020    To Whom it May Concern,      Luis Eduardo WIGGINS Camdenrashida is being seen at our clinic for post-operative follow up care. He is able to return to work on 5/18/20 with the restrictions of:    -25 pound weight restriction and may increase as tolerated.     -4 hour shifts for 2 weeks starting 5/18/20-6/1/20. Then may resume 8 hour shifts as tolerated.     Please call our clinic with questions or concerns: [phone]      Sincerely,      Osvaldo Eason MD

## 2020-05-18 NOTE — PROGRESS NOTES
Reason for Visit: Due to COVID-19 precautions, this visit was performed over the phone instead of face to face.    It was a pleasure to talk with Luis Eduardo Echevarria today by phone. He is a 45 year old male who underwent:    Procedure Date: 02/26/2020      PREOPERATIVE DIAGNOSIS:  L5 to S1 isthmic spondylolisthesis with stenosis and radiculopathy.      POSTOPERATIVE DIAGNOSIS:  L5 to S1 isthmic spondylolisthesis with stenosis and radiculopathy.      PROCEDURES:   1.  L3 to S1 posterior segmental instrumentation.   2.  S2 alar iliac pelvic instrumentation.   3.  Bilateral L4 to 5 and L5 to S1 transforaminal lumbar interbody fusion and Leone-Morocho osteotomies.   4.  Posterior arthrodesis L3 to S1.      SURGEON:  Dimitri Eason MD      ASSISTANT:  Fausto Gibbons PA-C      ANESTHESIA:  General endotracheal anesthesia.      ESTIMATED BLOOD LOSS:  1100 mL.     Feels Ok. Pain getting a lot better. Mowed lawn. Middle three toes tingling at times on left foot.     Not sure what he is going to do for work. 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    Plan: PT at [facility]. 25# lifting restriction may be increased as tolerated. We will see him back at 1 year postop with a  CT of the lumbar spine. We also discussed work hardening in the future if needed.      This phone visit took 7 minutes.

## 2020-06-25 ENCOUNTER — THERAPY VISIT (OUTPATIENT)
Dept: PHYSICAL THERAPY | Facility: CLINIC | Age: 45
End: 2020-06-25
Payer: COMMERCIAL

## 2020-06-25 DIAGNOSIS — Z47.89 AFTERCARE FOLLOWING SURGERY OF THE MUSCULOSKELETAL SYSTEM: ICD-10-CM

## 2020-06-25 DIAGNOSIS — M54.42 BILATERAL LOW BACK PAIN WITH LEFT-SIDED SCIATICA: ICD-10-CM

## 2020-06-25 DIAGNOSIS — Z98.1 S/P LUMBAR SPINAL FUSION: ICD-10-CM

## 2020-06-25 PROCEDURE — 97110 THERAPEUTIC EXERCISES: CPT | Mod: GP | Performed by: PHYSICAL THERAPIST

## 2020-06-25 PROCEDURE — 97161 PT EVAL LOW COMPLEX 20 MIN: CPT | Mod: GP | Performed by: PHYSICAL THERAPIST

## 2020-06-25 NOTE — PROGRESS NOTES
[facility] Initial Evaluation  Subjective:  Pt presents to PT s/p multi-level lumbar fusion.  DOS is 2/26/20.  Pt currently reports minimal LBP, no LE pain, and a feeling of weakness of his back.  He is off work and has a 25 lb lifting restriction.      The history is provided by the patient.   Therapist Generated HPI Evaluation         Type of problem:  Lumbar.    This is a chronic condition.    Where condition occurred: for unknown reasons.  Patient reports pain:  Lower lumbar spine.  Pain is described as aching and is intermittent.  Pain radiates to:  No radiation. Pain is the same all the time.  Since onset symptoms are gradually improving.  Associated symptoms:  Loss of motion/stiffness and loss of strength. Symptoms are exacerbated by bending, lifting and twisting  and relieved by rest.  Special tests included:  X-ray (fusion is doing well).  Past treatment: PT prior to surgery. There was mild improvement following previous treatment.  Restrictions due to condition include:  Currently not working due to present treatment.  Barriers include:  None as reported by patient.                        Objective:  System         Lumbar/SI Evaluation  ROM:    AROM Lumbar:   Flexion:          Fingers to mid shins  Ext:                    Moderate loss with LBP   Side Bend:        Left:  Moderate loss    Right:  Moderate loss  Rotation:           Left:     Right:   Side Glide:        Left:     Right:           Lumbar Myotomes:  normal            Lumbar DTR's:  not assessed        Lumbar Dermtomes:  not assessed                Neural Tension/Mobility:  Lumbar:  Normal                                                             General     ROS    Assessment/Plan:    Patient is a 45 year old male with lumbar complaints.    Patient has the following significant findings with corresponding treatment plan.                Diagnosis 1:  S/p lumbar fusion  Pain -  hot/cold therapy and education  Decreased  ROM/flexibility - therapeutic exercise and home program  Decreased strength - therapeutic exercise, therapeutic activities and home program    Therapy Evaluation Codes:   1) History comprised of:   Personal factors that impact the plan of care:      None.    Comorbidity factors that impact the plan of care are:      None.     Medications impacting care: None.  2) Examination of Body Systems comprised of:   Body structures and functions that impact the plan of care:      Lumbar spine.   Activity limitations that impact the plan of care are:      Bending, Dressing, Lifting, Sitting, Standing and Walking.  3) Clinical presentation characteristics are:   Stable/Uncomplicated.  4) Decision-Making    Low complexity using standardized patient assessment instrument and/or measureable assessment of functional outcome.  Cumulative Therapy Evaluation is: Low complexity.    Previous and current functional limitations:  (See Goal Flow Sheet for this information)    Short term and Long term goals: (See Goal Flow Sheet for this information)     Communication ability:  Patient appears to be able to clearly communicate and understand verbal and written communication and follow directions correctly.  Treatment Explanation - The following has been discussed with the patient:   RX ordered/plan of care  Anticipated outcomes  Possible risks and side effects  This patient would benefit from PT intervention to resume normal activities.   Rehab potential is good.    Frequency:  1 X week, once daily  Duration:  for 6 weeks  Discharge Plan:  Achieve all LTG.  Independent in home treatment program.  Reach maximal therapeutic benefit.    Please refer to the daily flowsheet for treatment today, total treatment time and time spent performing 1:1 timed codes.

## 2020-10-11 ENCOUNTER — E-VISIT (OUTPATIENT)
Dept: FAMILY MEDICINE | Facility: CLINIC | Age: 45
End: 2020-10-11
Payer: COMMERCIAL

## 2020-10-11 DIAGNOSIS — Z20.822 EXPOSURE TO COVID-19 VIRUS: Primary | ICD-10-CM

## 2020-10-11 PROCEDURE — 99207 PR NO BILLABLE SERVICE THIS VISIT: CPT | Performed by: FAMILY MEDICINE

## 2020-10-12 NOTE — PATIENT INSTRUCTIONS
Instructions for Patients  It is recommended that you have a test for coronavirus (COVID-19). This illness can cause fever, cough and trouble breathing. Many people get a mild case and get better on their own. Some people can get very sick.     Please follow these steps:    1. We will call to schedule your test.  2. A member of our care team will ask you some questions. Then, they will use a swab to collect samples from your nose and throat.     Our testing team will send you your test results.    How can I protect others?    Stay home and away from others (self-isolate) until:    You ve had no fever--and no medicine that reduces fever--for 1 full day (24 hours). And      Your other symptoms have resolved (gotten better). For example, your cough or breathing has improved. And     At least 10 days have passed since your symptoms started.    Stay at least 6 feet away from others. (If someone will drive you to your test, stay in the backseat, as far away from the  as you can.)     Don t go to work, school or anywhere else. When it s time for your test, go straight to the testing site. Don t make any stops on the way there or back.     Wash your hands and face often. Use soap and water.     Cover your mouth and nose with a mask, tissue or washcloth.     Don t touch anyone. No hugging, kissing or handshakes.    How can I take care of myself?    1. Get lots of rest. Drink extra fluids (unless a doctor has told you not to).     2. Take Tylenol (acetaminophen) for fever or pain. If you have liver or kidney problems, ask your family doctor if it's okay to take Tylenol.     Adults can take either:     650 mg (two 325 mg pills) every 4 to 6 hours, or     1,000 mg (two 500 mg pills) every 8 hours as needed.     Note: Don't take more than 3,000 mg in one day.   Acetaminophen is found in many medicines (both prescribed and over-the-counter medicines). Read all labels to be sure you don't take too much.   For children,  check the Tylenol bottle for the right dose. The dose is based on  the child's age or weight.    3. If you have other health problems (like cancer, heart failure, an organ transplant or severe kidney disease): Call your specialty clinic if you don't feel better in the next 2 days.    4. Know when to call 911: If your breathing is so bad that it keeps you from doing normal activities, call 911 or go to the emergency room. Tell them that you've been staying home and may have COVID-19.      Thank you for limiting contact with others, wearing a simple mask to cover your cough, practice good hand hygiene habits and accessing our virtual services where possible to limit the spread of this virus.    For more information about COVID19 and options for caring for yourself at home, please visit the CDC website at https://www.cdc.gov/coronavirus/2019-ncov/about/steps-when-sick.html  For more options for care at [facility], please visit our website at [email or web address]

## 2020-12-29 PROBLEM — Z47.89 AFTERCARE FOLLOWING SURGERY OF THE MUSCULOSKELETAL SYSTEM: Status: RESOLVED | Noted: 2020-06-25 | Resolved: 2020-12-29

## 2020-12-29 PROBLEM — M54.42 BILATERAL LOW BACK PAIN WITH LEFT-SIDED SCIATICA: Status: RESOLVED | Noted: 2020-06-25 | Resolved: 2020-12-29

## 2021-01-14 ENCOUNTER — HEALTH MAINTENANCE LETTER (OUTPATIENT)
Age: 46
End: 2021-01-14

## 2021-01-26 ENCOUNTER — TELEPHONE (OUTPATIENT)
Dept: NEUROSURGERY | Facility: CLINIC | Age: 46
End: 2021-01-26

## 2021-01-26 NOTE — TELEPHONE ENCOUNTER
Reason For Call: LVM for patient to return call [facility] to schedule 9 month follow up appointment with  from 5/18/20, with a Lumbar CT prior.

## 2021-04-01 ENCOUNTER — HOSPITAL ENCOUNTER (EMERGENCY)
Facility: CLINIC | Age: 46
Discharge: HOME | End: 2021-04-01
Attending: EMERGENCY MEDICINE | Admitting: EMERGENCY MEDICINE
Payer: COMMERCIAL

## 2021-04-01 VITALS
OXYGEN SATURATION: 99 % | TEMPERATURE: 98.3 F | SYSTOLIC BLOOD PRESSURE: 119 MMHG | DIASTOLIC BLOOD PRESSURE: 82 MMHG | RESPIRATION RATE: 18 BRPM | HEART RATE: 79 BPM

## 2021-04-01 DIAGNOSIS — F15.10 METHAMPHETAMINE ABUSE (H): ICD-10-CM

## 2021-04-01 DIAGNOSIS — R42 DIZZINESS: ICD-10-CM

## 2021-04-01 LAB
ANION GAP SERPL CALCULATED.3IONS-SCNC: 8 MMOL/L (ref 3–14)
BASOPHILS # BLD AUTO: 0.1 10E9/L (ref 0–0.2)
BASOPHILS NFR BLD AUTO: 1.2 %
BUN SERPL-MCNC: 8 MG/DL (ref 7–30)
CALCIUM SERPL-MCNC: 9.5 MG/DL (ref 8.5–10.1)
CHLORIDE SERPL-SCNC: 104 MMOL/L (ref 94–109)
CO2 SERPL-SCNC: 23 MMOL/L (ref 20–32)
CREAT SERPL-MCNC: 0.95 MG/DL (ref 0.66–1.25)
DIFFERENTIAL METHOD BLD: NORMAL
EOSINOPHIL # BLD AUTO: 0.3 10E9/L (ref 0–0.7)
EOSINOPHIL NFR BLD AUTO: 4.5 %
ERYTHROCYTE [DISTWIDTH] IN BLOOD BY AUTOMATED COUNT: 11.7 % (ref 10–15)
GFR SERPL CREATININE-BSD FRML MDRD: >90 ML/MIN/{1.73_M2}
GLUCOSE SERPL-MCNC: 104 MG/DL (ref 70–99)
HCT VFR BLD AUTO: 42 % (ref 40–53)
HGB BLD-MCNC: 14.9 G/DL (ref 13.3–17.7)
IMM GRANULOCYTES # BLD: 0 10E9/L (ref 0–0.4)
IMM GRANULOCYTES NFR BLD: 0.2 %
LYMPHOCYTES # BLD AUTO: 1.3 10E9/L (ref 0.8–5.3)
LYMPHOCYTES NFR BLD AUTO: 22.8 %
MCH RBC QN AUTO: 32.6 PG (ref 26.5–33)
MCHC RBC AUTO-ENTMCNC: 35.5 G/DL (ref 31.5–36.5)
MCV RBC AUTO: 92 FL (ref 78–100)
MONOCYTES # BLD AUTO: 0.6 10E9/L (ref 0–1.3)
MONOCYTES NFR BLD AUTO: 10.4 %
NEUTROPHILS # BLD AUTO: 3.5 10E9/L (ref 1.6–8.3)
NEUTROPHILS NFR BLD AUTO: 60.9 %
NRBC # BLD AUTO: 0 10*3/UL
NRBC BLD AUTO-RTO: 0 /100
PLATELET # BLD AUTO: 391 10E9/L (ref 150–450)
POTASSIUM SERPL-SCNC: 3.1 MMOL/L (ref 3.4–5.3)
RBC # BLD AUTO: 4.57 10E12/L (ref 4.4–5.9)
SODIUM SERPL-SCNC: 135 MMOL/L (ref 133–144)
WBC # BLD AUTO: 5.8 10E9/L (ref 4–11)

## 2021-04-01 PROCEDURE — 80048 BASIC METABOLIC PNL TOTAL CA: CPT | Performed by: EMERGENCY MEDICINE

## 2021-04-01 PROCEDURE — 250N000011 HC RX IP 250 OP 636: Performed by: EMERGENCY MEDICINE

## 2021-04-01 PROCEDURE — 85025 COMPLETE CBC W/AUTO DIFF WBC: CPT | Performed by: EMERGENCY MEDICINE

## 2021-04-01 PROCEDURE — 99285 EMERGENCY DEPT VISIT HI MDM: CPT | Mod: 25

## 2021-04-01 PROCEDURE — 96374 THER/PROPH/DIAG INJ IV PUSH: CPT

## 2021-04-01 PROCEDURE — 258N000003 HC RX IP 258 OP 636: Performed by: EMERGENCY MEDICINE

## 2021-04-01 PROCEDURE — 96361 HYDRATE IV INFUSION ADD-ON: CPT

## 2021-04-01 RX ORDER — SODIUM CHLORIDE 9 MG/ML
INJECTION, SOLUTION INTRAVENOUS CONTINUOUS
Status: DISCONTINUED | OUTPATIENT
Start: 2021-04-01 | End: 2021-04-01 | Stop reason: HOSPADM

## 2021-04-01 RX ORDER — ACETAMINOPHEN 500 MG
500 TABLET ORAL EVERY 4 HOURS PRN
Status: DISCONTINUED | OUTPATIENT
Start: 2021-04-01 | End: 2021-04-01

## 2021-04-01 RX ORDER — LORAZEPAM 2 MG/ML
0.5 INJECTION INTRAMUSCULAR ONCE
Status: COMPLETED | OUTPATIENT
Start: 2021-04-01 | End: 2021-04-01

## 2021-04-01 RX ADMIN — LORAZEPAM 0.5 MG: 2 INJECTION INTRAMUSCULAR; INTRAVENOUS at 03:38

## 2021-04-01 RX ADMIN — SODIUM CHLORIDE 1000 ML: 9 INJECTION, SOLUTION INTRAVENOUS at 03:38

## 2021-04-01 ASSESSMENT — ENCOUNTER SYMPTOMS
VOMITING: 0
APPETITE CHANGE: 1
NUMBNESS: 1
DIZZINESS: 1

## 2021-04-01 NOTE — ED NOTES
Bed: ED09  Expected date: 4/1/21  Expected time: 2:54 AM  Means of arrival: Ambulance  Comments:  [id number]- Dizziness

## 2021-04-01 NOTE — ED TRIAGE NOTES
Pt to ER with c/o numbness and tingling in his hands , pt has been doing meth and now feels dizzy  Some nausea

## 2021-04-01 NOTE — ED PROVIDER NOTES
History   Chief Complaint:  Dizziness     HPI   Luis Eduardo Echevarria is a 46 year old male with history of methamphetamine abuse who presents with dizziness and numbness. The patient reports that he recently ran into a friend a few days ago who supplied him with meth. He has a history of methamphetamine abuse, but was previously in remission until meeting his friend. Yesterday he states he had an episode of dizziness that lasted about 5 minutes while at work. He then had an other episode of dizziness that began about 30 minutes ago. He also notes that he has been having numbness in her fingers, the sensation that his tongue is swelling, tinnitus and a decreased appetite. The patient last used methamphetamines at 2000 yesterday evening and states he normally snorts or smokes. He has not had any vomiting.     Review of Systems   Constitutional: Positive for appetite change.   HENT: Positive for tinnitus.    Gastrointestinal: Negative for vomiting.   Neurological: Positive for dizziness and numbness.   All other systems reviewed and are negative.      Allergies:  The patient has no known allergies.     Medications:  Norco     Past Medical History:    Spinal stenosis  Spondylolisthesis  Depression  Methamphetamine abuse     Past Surgical History:    Spinal fusion  [employer] teeth surgery    Social History:  The patient presents alone.   He states that he recently relapsed on meth.   He works as a fork .  The patient is currently staying at his aunt's house.     Physical Exam     Patient Vitals for the past 24 hrs:   BP Temp Temp src Pulse Resp SpO2   04/01/21 0314 (!) 142/95 98.3  F (36.8  C) Oral 67 18 100 %       Physical Exam  General: Patient is alert and interactive when I enter the room  Head:  The scalp, face, and head appear normal  Eyes:  Conjunctivae are normal  ENT:    The nose is normal    Pinnae are normal    External acoustic canals are normal  Neck:  Trachea midline  CV:  Pulses are yasmin  Resp:  No  respiratory distress  Abdomen:      Soft, non-tender, non-distended  Musc:  Normal muscular tone    No major joint effusions    No asymmetric leg swelling  Skin:  No rash or lesions noted  Neuro:  Speech is normal and fluent. Face is symmetric.     Moving all extremities well. No pronator drift. No leg drift.   Psych: Awake. Alert.  Normal affect. Tangential at times. Mildly pressured speech. Appropriate interactions.     Emergency Department Course   Laboratory:  CBC: WBC 5.8, HGB 14.9,    BMP: Potassium 3.1 (L), Glucose 104 (H)  o/w WNL (Creatinine 0.95)      Emergency Department Course:    Reviewed:  I reviewed nursing notes, vitals, past medical history and care everywhere    Assessments:  0326 I obtained history and examined the patient as noted above.   0531 I rechecked the patient and he is now asleep.     Interventions:  0338 NS, 1 L, IV bolus   0338 Ativan 0.5 mg IV     Disposition:  Care of the patient was transferred to my colleague Dr. Varghese pending metabolization.       Impression & Plan     Medical Decision Making:  Luis Eduardo Echevarria is a 46 year old male who presents with dizziness.  Patient admits to using meth recently after being sober.  His symptoms do correlate with methamphetamine abuse and admits to feeling high.  Patient was given IV fluids and encouraged to eat.  He was also given Ativan.  At this time he fell asleep and is now likely coming down from the methamphetamine high.  His symptoms do not seem to represent a stroke and he is neurologically intact.  I think at this point he can be discharged once he is much more sober and able to ambulate.  Will sign out to oncoming physician awaiting clinical sobriety.  Diagnosis:    ICD-10-CM    1. Methamphetamine abuse (H)  F15.10    2. Dizziness  R42          Scribe Disclosure:  I, Randy Cramer, am serving as a scribe at 3:28 AM on 4/1/2021 to document services personally performed by Yahaira Novak MD based on my observations and the  provider's statements to me.              Yahaira Novak MD  04/01/21 0651

## 2021-04-01 NOTE — LETTER
April 1, 2021      To Whom It May Concern:      Luis Eduardo Echevarria was seen in our Emergency Department today, 04/01/21.  I expect his condition to improve over the next day.  He may return to work when improved.    Sincerely,        Dwight Varghese MD

## 2021-10-24 ENCOUNTER — HEALTH MAINTENANCE LETTER (OUTPATIENT)
Age: 46
End: 2021-10-24

## 2022-02-13 ENCOUNTER — HEALTH MAINTENANCE LETTER (OUTPATIENT)
Age: 47
End: 2022-02-13

## 2022-02-25 ENCOUNTER — APPOINTMENT (OUTPATIENT)
Dept: GENERAL RADIOLOGY | Facility: CLINIC | Age: 47
End: 2022-02-25
Attending: EMERGENCY MEDICINE
Payer: COMMERCIAL

## 2022-02-25 ENCOUNTER — HOSPITAL ENCOUNTER (EMERGENCY)
Facility: CLINIC | Age: 47
Discharge: HOME | End: 2022-02-25
Attending: EMERGENCY MEDICINE | Admitting: EMERGENCY MEDICINE
Payer: COMMERCIAL

## 2022-02-25 VITALS
DIASTOLIC BLOOD PRESSURE: 88 MMHG | TEMPERATURE: 98.6 F | WEIGHT: 175 LBS | BODY MASS INDEX: 27.47 KG/M2 | OXYGEN SATURATION: 100 % | HEART RATE: 86 BPM | RESPIRATION RATE: 16 BRPM | SYSTOLIC BLOOD PRESSURE: 140 MMHG | HEIGHT: 67 IN

## 2022-02-25 DIAGNOSIS — M54.50 ACUTE BILATERAL LOW BACK PAIN WITHOUT SCIATICA: ICD-10-CM

## 2022-02-25 PROCEDURE — 72100 X-RAY EXAM L-S SPINE 2/3 VWS: CPT

## 2022-02-25 PROCEDURE — 99284 EMERGENCY DEPT VISIT MOD MDM: CPT | Mod: 25

## 2022-02-25 PROCEDURE — 72072 X-RAY EXAM THORAC SPINE 3VWS: CPT

## 2022-02-25 PROCEDURE — 250N000013 HC RX MED GY IP 250 OP 250 PS 637: Performed by: EMERGENCY MEDICINE

## 2022-02-25 RX ORDER — HYDROCODONE BITARTRATE AND ACETAMINOPHEN 5; 325 MG/1; MG/1
1 TABLET ORAL EVERY 6 HOURS PRN
Qty: 6 TABLET | Refills: 0 | Status: SHIPPED | OUTPATIENT
Start: 2022-02-25 | End: 2022-03-01

## 2022-02-25 RX ORDER — OXYCODONE HYDROCHLORIDE 5 MG/1
10 TABLET ORAL ONCE
Status: COMPLETED | OUTPATIENT
Start: 2022-02-25 | End: 2022-02-25

## 2022-02-25 RX ORDER — CYCLOBENZAPRINE HCL 10 MG
5-10 TABLET ORAL 3 TIMES DAILY PRN
Qty: 20 TABLET | Refills: 0 | Status: SHIPPED | OUTPATIENT
Start: 2022-02-25 | End: 2022-03-01

## 2022-02-25 RX ORDER — METHOCARBAMOL 750 MG/1
750 TABLET, FILM COATED ORAL ONCE
Status: COMPLETED | OUTPATIENT
Start: 2022-02-25 | End: 2022-02-25

## 2022-02-25 RX ADMIN — METHOCARBAMOL 750 MG: 750 TABLET ORAL at 01:09

## 2022-02-25 RX ADMIN — OXYCODONE HYDROCHLORIDE 10 MG: 5 TABLET ORAL at 01:08

## 2022-02-25 NOTE — ED PROVIDER NOTES
History     Chief Complaint:    Back Pain       HPI   Luis Eduardo Echevarria is a 47 year old male who presents with concern for acute mid to low back pain after attempting to push a vehicle out of snow this evening.  Patient endorses history of lumbar spinal fusion/surgery 2 years ago.  He has had difficulty getting to the bathroom on his own, requiring assistance from his significant other.  He denies trouble emptying the bladder or perineal anesthesia or numbness or weakness in the legs.  No fevers or IV drug use or chronic steroid use or blood thinner use.    Allergies:  No Known Allergies     Medications:    cyclobenzaprine (FLEXERIL) 10 MG tablet  HYDROcodone-acetaminophen (NORCO) 5-325 MG tablet  HYDROcodone-acetaminophen (NORCO) 5-325 MG tablet        Past Medical History:    Past Medical History:   Diagnosis Date     Spinal stenosis      Spondylolisthesis at L5-S1 level        Patient Active Problem List    Diagnosis Date Noted     History of lumbar fusion 02/26/2020     Priority: Medium     Isthmic spondylolisthesis 12/09/2019     Priority: Medium     Spinal stenosis of lumbar region with radiculopathy 12/09/2019     Priority: Medium        Past Surgical History:    Past Surgical History:   Procedure Laterality Date     no surgeries       OPTICAL TRACKING SYSTEM FUSION SPINE POSTERIOR LUMBAR THREE+ LEVELS N/A 2/26/2020    Procedure: Lumbar 3-Sacral 1 posterior segmental instrumentation. Pelvic Instrumentation. Bilateral Lumbar 3-Sacral 1 transforaminal interbody fusion and Leone Muro osteotomies. Posterior arthrodesis Lumbar 3-Sacral 1;  Surgeon: Dimitri Eason MD;  Location:  OR     [facility]          Family History:    family history includes No Known Problems in his father and mother.    Social History:   reports that he has been smoking cigarettes. He has been smoking about 1.00 pack per day. He has quit using smokeless tobacco. He reports current alcohol use. He reports current drug use.  "Drug: Marijuana.    PCP: Nadeem Joseph     Review of Systems  A 10 point ROS was obtained and negative except as noted here and in HPI      Physical Exam     Patient Vitals for the past 24 hrs:   BP Temp Temp src Pulse Resp SpO2 Height Weight   02/25/22 0250 (!) 140/88 -- -- -- 16 100 % -- --   02/25/22 0100 -- -- -- -- -- 100 % -- --   02/25/22 0029 (!) 144/99 98.6  F (37  C) Oral 86 20 98 % 1.702 m (5' 7\") 79.4 kg (175 lb)        Physical Exam  VS: Reviewed per above  HENT: Mucous membranes moist, no nuchal rigidity  EYES: sclera anicteric  CV: Rate as noted, regular rhythm.   RESP: Effort normal. Breath sounds are normal bilaterally.  GI: no tenderness/rebound/guarding, not distended.  NEURO: GCS 15, cranial nerves II through XII are intact, 5 out of 5 strength in all 4 extremities, sensation is intact light touch in all 4 extremities  MSK: No deformity of the extremities, no midline T/L-spine tenderness.  No overlying skin changes of the back is redness or swelling.  SKIN: Warm and dry      Emergency Department Course       Imaging:    Lumbar spine XR, 2-3 views   Final Result   IMPRESSION: Redemonstration of postsurgical changes of posterior instrumented fusion from L3 through S1 with pedicle screws at L3, L4 and S1 and intervening vertical stabilization rods. Bilateral instrumented sacroiliac arthrodesis. The fusion hardware    appears intact without evidence of loosening or fracture. Postsurgical changes of anterior interbody fusion at L4-L5 and L5-S1 again demonstrated. Chronic wedging along the inferior endplate of L5 is stable. Additional lumbar vertebrae are normal in    height and alignment. No definite acute fracture.      Thoracic spine XR, 3 views   Final Result   IMPRESSION: Mild age-indeterminate height loss along the superior endplate at T11. Additional thoracic vertebrae are grossly normal in height and alignment. Underlying mild degenerative changes.            Interventions:    Medications "   methocarbamol (ROBAXIN) tablet 750 mg (750 mg Oral Given 2/25/22 0109)   oxyCODONE (ROXICODONE) tablet 10 mg (10 mg Oral Given 2/25/22 0108)        Emergency Department Course:  Past medical records, nursing notes, and vitals reviewed.  I performed an exam of the patient and obtained history, as documented above.    I rechecked the patient. Findings and plan explained to the Patient and significant other. Patient was discharged.    Impression & Plan        Medical Decision Making:  Luis Eduardo Echevarria presented with back pain.  The pain has improved with interventions in the emergency department. Pt did not sustain any focal trauma, and x-rays today reveal stable hardware in the lumbar spine.  There was height loss of the T11 superior endplate of unclear chronicity.  This was relayed to patient.  Pt has no back pain red flags on history or exam to suggest spinal cord compression syndrome (cauda equina syndrome, spinal/epidural space hematoma), spinal column infection (epidural abscess, discitis, osteomyelitis), nor malignancy/metastatic disease.     The neurological exam is normal and pt's symptoms are consistent with a musculoskeletal strain. There is no current evidence of radiculopathy or myelopathy. The patient will be discharged with few pills of Vicodin as well as Flexeril to use as directed.  He was provided with abdominal binder and walker.    Luis Eduardo was directed to avoid heavy lifting, bending or twisting. He  was told to return for:  increasing pain, numbness, weakness, or bowel or bladder dysfunction.  Pt  was advised to schedule follow-up with his spine surgeon tomorrow to discuss further evaluation/imaging.          Diagnosis:    ICD-10-CM    1. Acute bilateral low back pain without sciatica  M54.50         Discharge Medications:  Discharge Medication List as of 2/25/2022  2:50 AM      START taking these medications    Details   cyclobenzaprine (FLEXERIL) 10 MG tablet Take 0.5-1 tablets (5-10 mg) by mouth  3 times daily as needed for muscle spasms, Disp-20 tablet, R-0, Local Print      !! HYDROcodone-acetaminophen (NORCO) 5-325 MG tablet Take 1 tablet by mouth every 6 hours as needed for severe pain, Disp-6 tablet, R-0, Local Print       !! - Potential duplicate medications found. Please discuss with provider.           2/25/2022   Alok Batista MD Lindenbaum, Elan, MD  02/25/22 0379

## 2022-02-25 NOTE — ED NOTES
Bed: ED21  Expected date:   Expected time:   Means of arrival:   Comments:  Pt needs to be entered from downtime

## 2022-02-25 NOTE — ED TRIAGE NOTES
Pt. Presents to ED from [facility] for back pain after trying to push his car out of the snow. Pt. Reports he is here for an MRI. Reports he had surgery on his back 2 years ago. AVSS on RA. Minimal assist with transfers despite SO stating patient required help standing and peeing PTA.

## 2022-03-01 ENCOUNTER — OFFICE VISIT (OUTPATIENT)
Dept: NEUROSURGERY | Facility: CLINIC | Age: 47
End: 2022-03-01
Attending: PHYSICIAN ASSISTANT
Payer: COMMERCIAL

## 2022-03-01 VITALS
WEIGHT: 168 LBS | DIASTOLIC BLOOD PRESSURE: 94 MMHG | SYSTOLIC BLOOD PRESSURE: 149 MMHG | OXYGEN SATURATION: 100 % | HEIGHT: 67 IN | HEART RATE: 87 BPM | BODY MASS INDEX: 26.37 KG/M2

## 2022-03-01 DIAGNOSIS — Z98.1 S/P LUMBAR SPINAL FUSION: Primary | ICD-10-CM

## 2022-03-01 DIAGNOSIS — Z98.1 S/P LUMBAR FUSION: ICD-10-CM

## 2022-03-01 PROCEDURE — G0463 HOSPITAL OUTPT CLINIC VISIT: HCPCS

## 2022-03-01 PROCEDURE — 99203 OFFICE O/P NEW LOW 30 MIN: CPT | Performed by: PHYSICIAN ASSISTANT

## 2022-03-01 RX ORDER — HYDROCODONE BITARTRATE AND ACETAMINOPHEN 5; 325 MG/1; MG/1
1-2 TABLET ORAL EVERY 6 HOURS PRN
Qty: 20 TABLET | Refills: 0 | Status: SHIPPED | OUTPATIENT
Start: 2022-03-01 | End: 2022-04-21

## 2022-03-01 RX ORDER — CYCLOBENZAPRINE HCL 10 MG
5-10 TABLET ORAL 3 TIMES DAILY PRN
Qty: 30 TABLET | Refills: 0 | Status: SHIPPED | OUTPATIENT
Start: 2022-03-01 | End: 2022-04-21

## 2022-03-01 ASSESSMENT — PAIN SCALES - GENERAL: PAINLEVEL: MILD PAIN (3)

## 2022-03-01 NOTE — NURSING NOTE
"Luis Eduardo Echevarria is a 47 year old male who presents for:  Chief Complaint   Patient presents with     RECHECK     back pain started when he was pushing car out of his ditch, few days ago, also complains of neck pain        Vitals:    Vitals:    03/01/22 1029   BP: (!) 149/94   Pulse: 87   SpO2: 100%   Weight: 168 lb (76.2 kg)   Height: 5' 7\" (1.702 m)       BMI:  Estimated body mass index is 26.31 kg/m  as calculated from the following:    Height as of this encounter: 5' 7\" (1.702 m).    Weight as of this encounter: 168 lb (76.2 kg).    Pain Score:  Mild Pain (3)        Amendo Phorn      "

## 2022-03-01 NOTE — PROGRESS NOTES
"NEUROSURGERY CLINIC CONSULT NOTE     DATE OF VISIT: 3/1/2022     SUBJECTIVE:   Luis Eduardo Echevarria is a pleasant 47 year old male who presents to the clinic today for consultation on thoracic and lumbar pain. He is referred to the Neurosurgery Clinic by Dr. Joseph in Primary Care. Pertinent medical history consists of prior L3-S1 posterior segmental instrumentation, S2 alar iliac pelvic instrumentation, bilateral L4-5, L5-S1 TLIF with Dr. Eason.     Thoracic pain   Today, he reports a several day history of symptoms. He describes constant, aching with intermittent sharp pain that initiates in the low thoracic region and does not radiate. This pain is not accompanied by paresthesia, numbness and/or perceived weakness in the same distribution. Prolonged walking, standing, bending, twisting and lifting aggravate the symptoms, while alleviation is obtained by resting. He was attempting to push his car out of a ditch on 2/25 which is associated with the onset of the pain. There are no bowel or bladder changes. He denies saddle anesthesia. He denies changes in gait, instability, or falling episodes.    He has participated in conservative therapies to include narcotic and muscle relaxer. None of these modalities have provided any significant long term relief.     Low back pain s/p fusion  Today, he reports a several month history of symptoms. He describes intermittent aching pain that initiates in the left low lumbar region and does not radiate distally. This pain is not accompanied by paresthesia, numbness and/or perceived weakness in the same distribution. Prolonged bending, twisting and standing aggravate the symptoms, while alleviation is obtained by resting and medications. He was at work and was twisting and heard a \"pop\" which is associated with the onset of the pain. There are no bowel or bladder changes. He denies saddle anesthesia. He denies changes in gait, instability, or falling episodes.     He has participated in " "conservative therapies to include medications. None of these modalities have provided any significant long term relief.       Current Outpatient Medications:      cyclobenzaprine (FLEXERIL) 10 MG tablet, Take 0.5-1 tablets (5-10 mg) by mouth 3 times daily as needed for muscle spasms, Disp: 30 tablet, Rfl: 0     HYDROcodone-acetaminophen (NORCO) 5-325 MG tablet, Take 1-2 tablets by mouth every 6 hours as needed for moderate to severe pain, Disp: 20 tablet, Rfl: 0     No Known Allergies     Past Medical History:   Diagnosis Date     Spinal stenosis      Spondylolisthesis at L5-S1 level     grade 3        ROS: 10 point review of symptoms are negative other than the symptoms noted above in the HPI.     Family History has been reviewed with the patient, there are no pertinent findings to presenting concern.     Past Surgical History:   Procedure Laterality Date     no surgeries       OPTICAL TRACKING SYSTEM FUSION SPINE POSTERIOR LUMBAR THREE+ LEVELS N/A 2/26/2020    Procedure: Lumbar 3-Sacral 1 posterior segmental instrumentation. Pelvic Instrumentation. Bilateral Lumbar 3-Sacral 1 transforaminal interbody fusion and Leone Muro osteotomies. Posterior arthrodesis Lumbar 3-Sacral 1;  Surgeon: Dimitri Eason MD;  Location: [facility] OR     wisdom teeth          Social History     Tobacco Use     Smoking status: Current Every Day Smoker     Packs/day: 1.00     Types: Cigarettes     Smokeless tobacco: Former User   Substance Use Topics     Alcohol use: Yes     Comment: 2x week     Drug use: Yes     Types: Marijuana     Comment: occ        OBJECTIVE:   BP (!) 149/94   Pulse 87   Ht 5' 7\" (1.702 m)   Wt 168 lb (76.2 kg)   SpO2 100%   BMI 26.31 kg/m     Body mass index is 26.31 kg/m .     Imaging:     EXAM: XR THORACIC SPINE 3 VIEWS  LOCATION: [facility]  DATE/TIME: 2/25/2022 1:58 AM     INDICATION: T and L spine pain after pushing car out of snow.  COMPARISON: None.  TECHNIQUE: CR Thoracic " Spine.                                                                      IMPRESSION: Mild age-indeterminate height loss along the superior endplate at T11. Additional thoracic vertebrae are grossly normal in height and alignment. Underlying mild degenerative changes.       EXAM: XR LUMBAR SPINE 2-3 VIEWS  LOCATION: [facility]  DATE/TIME: 2/25/2022 1:58 AM     INDICATION: T and L spine pain after pushing car out of snow.  COMPARISON: 05/08/2020  TECHNIQUE: CR Lumbar Spine.                                                                      IMPRESSION: Redemonstration of postsurgical changes of posterior instrumented fusion from L3 through S1 with pedicle screws at L3, L4 and S1 and intervening vertical stabilization rods. Bilateral instrumented sacroiliac arthrodesis. The fusion hardware   appears intact without evidence of loosening or fracture. Postsurgical changes of anterior interbody fusion at L4-L5 and L5-S1 again demonstrated. Chronic wedging along the inferior endplate of L5 is stable. Additional lumbar vertebrae are normal in   height and alignment. No definite acute fracture.  Full radiological report in chart. Imaging was reviewed with with patient today.     Exam:   CN II-XII grossly intact, alert and appropriate with conversation and following commands.   Gait is non-antalgic. Able to tandem walk. Able to walk on toes and heels without difficulty.   Cervical spine is non tender to palpation.  Bilateral bicep 2/4 and tricep reflexes 2/4. Sensation intact throughout upper extremities.     UE muscle strength  Right  Left    Deltoid  5/5  5/5    Biceps  5/5  5/5    Triceps  5/5  5/5    Hand intrinsics  5/5  5/5    Hand grasp  5/5  5/5           Thoracic spine is non tender to palpation  Lumbar spine is non tender to palpation.  Intact sensation throughout lower extremities.   Bilateral patellar 2+/4 and achilles reflex 2/4.      LE muscle strength  Right  Left    Iliopsoas (hip  flexion)  5/5  5/5    Quad (knee extension)  5/5  5/5    Hamstring (knee flexion)  5/5  5/5    Gastrocnemius (PF)  5/5  5/5    Tibialis Ant. (DF)  5/5  5/5    EHL  5/5  5/5      Negative for clonus.   Calves are soft and non-tender bilaterally.     ASSESSMENT/PLAN:     Luis Eduardo Echevarria is a pleasant 47 year old male who presents to the clinic today for consultation on thoracic and lumbar pain. He is referred to the Neurosurgery Clinic by Dr. Joseph in Primary Care. Pertinent medical history consists of prior L3-S1 posterior segmental instrumentation, S2 alar iliac pelvic instrumentation, bilateral L4-5, L5-S1 TLIF with Dr. Eason.     The patient's most recent imaging was reviewed with him today. It was explained that images show mild age-indeterminate height loss along the superior endplate at T11. On exam, he is noted to have appropriate strength, sensation and range of motion. He has attempted conservative management with medications, without resolution of symptoms.     Based on his physical exam, we do feel that it would be in his best interest to obtain a thoracic and lumbar MRI to further assess our concern for acute T11 compression fracture in addition to assessing lumbar area due to recent concerns on increased pain with history of prior surgery. Once the images have been obtained she has agreed to call our office back at [phone] to further discuss possible surgical interventions or other conservative therapies.      In the meantime, recommend light activity only and to avoid excessive bending, twisting, lifting >5-10 lbs until MRI has been obtained. Recommend continuing with medications on an as needed basis as well. I will refill today but cannot after this point as Luis Eduardo has not had surgical intervention with our team in the last 6 months and therefore further refills should come from PCP.     We will discuss MRI results after MRis have been obtained and next steps. In the event that patient's symptoms  worsen or change we would like to see him sooner. We also discussed signs of a worsening problem that he should seek being evaluated.     Respectfully,     Lizbeth Beck PA-C  [facility] Neurosurgery  [address] [address]    Tel [phone]

## 2022-03-01 NOTE — LETTER
[facility] NEUROSURGERY CLINIC [place]  [address]  [place] [address]  Phone: [phone]  Fax: [phone]    March 1, 2022        Luis Eduardo Echevarria  [address]   [place] [address]    To whom it may concern:    RE: Luis Eduardo Echevarria    Patient may return to work 03/07/2022 with the following:  Light duty-unable to lift more than 5-10 pounds  When the patient returns to work, the following restrictions apply until seen again in Neurosurgery Clinic:      Bend: Not at all (0 hours)  Lift, carry, push, and pull no more than: 0-10 lbs.     Please contact me for questions or concerns.      Sincerely,    Lizbeth VALDES [facility] Neurosurgery  [address] [address]    Tel [phone]

## 2022-03-01 NOTE — PATIENT INSTRUCTIONS
-Obtain thoracic and lumbar MRI w/o to further evaluate T11 and lumbar region where prior surgery was  -You will need to call to schedule MRIs  -Will call you within 3-5 days after MRIs are obtained to review results and next steps  -Continue taking medications as needed  -Light activity only- avoid excessive bending, twisting, lifting >5-10 lbs     Call [place] radiology scheduling for your procedure:  For scheduling in the [facility] call [phone]  or  [phone]        Lizbeth VALDES [facility] Neurosurgery  [facility]  [address] [place] [address]  [address] [place] [id number]    Tel [phone]

## 2022-03-01 NOTE — LETTER
3/1/2022         RE: Luis Eduardo Echevarria  [address]  [place] [address]        Dear Colleague,    Thank you for referring your patient, Luis Eduardo Echevarria, to the [facility] NEUROSURGERY CLINIC [place]. Please see a copy of my visit note below.    NEUROSURGERY CLINIC CONSULT NOTE     DATE OF VISIT: 3/1/2022     SUBJECTIVE:   Luis Eduardo Echevarria is a pleasant 47 year old male who presents to the clinic today for consultation on thoracic and lumbar pain. He is referred to the Neurosurgery Clinic by Dr. Joseph in Primary Care. Pertinent medical history consists of prior L3-S1 posterior segmental instrumentation, S2 alar iliac pelvic instrumentation, bilateral L4-5, L5-S1 TLIF with Dr. Eason.     Thoracic pain   Today, he reports a several day history of symptoms. He describes constant, aching with intermittent sharp pain that initiates in the low thoracic region and does not radiate. This pain is not accompanied by paresthesia, numbness and/or perceived weakness in the same distribution. Prolonged walking, standing, bending, twisting and lifting aggravate the symptoms, while alleviation is obtained by resting. He was attempting to push his car out of a ditch on 2/25 which is associated with the onset of the pain. There are no bowel or bladder changes. He denies saddle anesthesia. He denies changes in gait, instability, or falling episodes.    He has participated in conservative therapies to include narcotic and muscle relaxer. None of these modalities have provided any significant long term relief.     Low back pain s/p fusion  Today, he reports a several month history of symptoms. He describes intermittent aching pain that initiates in the left low lumbar region and does not radiate distally. This pain is not accompanied by paresthesia, numbness and/or perceived weakness in the same distribution. Prolonged bending, twisting and standing aggravate the symptoms, while alleviation is obtained by  "resting and medications. He was at work and was twisting and heard a \"pop\" which is associated with the onset of the pain. There are no bowel or bladder changes. He denies saddle anesthesia. He denies changes in gait, instability, or falling episodes.     He has participated in conservative therapies to include medications. None of these modalities have provided any significant long term relief.       Current Outpatient Medications:      cyclobenzaprine (FLEXERIL) 10 MG tablet, Take 0.5-1 tablets (5-10 mg) by mouth 3 times daily as needed for muscle spasms, Disp: 30 tablet, Rfl: 0     HYDROcodone-acetaminophen (NORCO) 5-325 MG tablet, Take 1-2 tablets by mouth every 6 hours as needed for moderate to severe pain, Disp: 20 tablet, Rfl: 0     No Known Allergies     Past Medical History:   Diagnosis Date     Spinal stenosis      Spondylolisthesis at L5-S1 level     grade 3        ROS: 10 point review of symptoms are negative other than the symptoms noted above in the HPI.     Family History has been reviewed with the patient, there are no pertinent findings to presenting concern.     Past Surgical History:   Procedure Laterality Date     no surgeries       OPTICAL TRACKING SYSTEM FUSION SPINE POSTERIOR LUMBAR THREE+ LEVELS N/A 2/26/2020    Procedure: Lumbar 3-Sacral 1 posterior segmental instrumentation. Pelvic Instrumentation. Bilateral Lumbar 3-Sacral 1 transforaminal interbody fusion and Leone Muro osteotomies. Posterior arthrodesis Lumbar 3-Sacral 1;  Surgeon: Dimitri Eason MD;  Location:  OR     [facility]          Social History     Tobacco Use     Smoking status: Current Every Day Smoker     Packs/day: 1.00     Types: Cigarettes     Smokeless tobacco: Former User   Substance Use Topics     Alcohol use: Yes     Comment: 2x week     Drug use: Yes     Types: Marijuana     Comment: occ        OBJECTIVE:   BP (!) 149/94   Pulse 87   Ht 5' 7\" (1.702 m)   Wt 168 lb (76.2 kg)   SpO2 100%   BMI 26.31 " kg/m     Body mass index is 26.31 kg/m .     Imaging:     EXAM: XR THORACIC SPINE 3 VIEWS  LOCATION: [facility]  DATE/TIME: 2/25/2022 1:58 AM     INDICATION: T and L spine pain after pushing car out of snow.  COMPARISON: None.  TECHNIQUE: CR Thoracic Spine.                                                                      IMPRESSION: Mild age-indeterminate height loss along the superior endplate at T11. Additional thoracic vertebrae are grossly normal in height and alignment. Underlying mild degenerative changes.       EXAM: XR LUMBAR SPINE 2-3 VIEWS  LOCATION: [facility]  DATE/TIME: 2/25/2022 1:58 AM     INDICATION: T and L spine pain after pushing car out of snow.  COMPARISON: 05/08/2020  TECHNIQUE: CR Lumbar Spine.                                                                      IMPRESSION: Redemonstration of postsurgical changes of posterior instrumented fusion from L3 through S1 with pedicle screws at L3, L4 and S1 and intervening vertical stabilization rods. Bilateral instrumented sacroiliac arthrodesis. The fusion hardware   appears intact without evidence of loosening or fracture. Postsurgical changes of anterior interbody fusion at L4-L5 and L5-S1 again demonstrated. Chronic wedging along the inferior endplate of L5 is stable. Additional lumbar vertebrae are normal in   height and alignment. No definite acute fracture.  Full radiological report in chart. Imaging was reviewed with with patient today.     Exam:   CN II-XII grossly intact, alert and appropriate with conversation and following commands.   Gait is non-antalgic. Able to tandem walk. Able to walk on toes and heels without difficulty.   Cervical spine is non tender to palpation.  Bilateral bicep 2/4 and tricep reflexes 2/4. Sensation intact throughout upper extremities.     UE muscle strength  Right  Left    Deltoid  5/5  5/5    Biceps  5/5  5/5    Triceps  5/5  5/5    Hand intrinsics  5/5  5/5     Hand grasp  5/5  5/5           Thoracic spine is non tender to palpation  Lumbar spine is non tender to palpation.  Intact sensation throughout lower extremities.   Bilateral patellar 2+/4 and achilles reflex 2/4.      LE muscle strength  Right  Left    Iliopsoas (hip flexion)  5/5  5/5    Quad (knee extension)  5/5  5/5    Hamstring (knee flexion)  5/5  5/5    Gastrocnemius (PF)  5/5  5/5    Tibialis Ant. (DF)  5/5  5/5    EHL  5/5  5/5      Negative for clonus.   Calves are soft and non-tender bilaterally.     ASSESSMENT/PLAN:     Luis Eduardo Echevarria is a pleasant 47 year old male who presents to the clinic today for consultation on thoracic and lumbar pain. He is referred to the Neurosurgery Clinic by Dr. Joseph in Primary Care. Pertinent medical history consists of prior L3-S1 posterior segmental instrumentation, S2 alar iliac pelvic instrumentation, bilateral L4-5, L5-S1 TLIF with Dr. Eason.     The patient's most recent imaging was reviewed with him today. It was explained that images show mild age-indeterminate height loss along the superior endplate at T11. On exam, he is noted to have appropriate strength, sensation and range of motion. He has attempted conservative management with medications, without resolution of symptoms.     Based on his physical exam, we do feel that it would be in his best interest to obtain a thoracic and lumbar MRI to further assess our concern for acute T11 compression fracture in addition to assessing lumbar area due to recent concerns on increased pain with history of prior surgery. Once the images have been obtained she has agreed to call our office back at [phone] to further discuss possible surgical interventions or other conservative therapies.      In the meantime, recommend light activity only and to avoid excessive bending, twisting, lifting >5-10 lbs until MRI has been obtained. Recommend continuing with medications on an as needed basis as well. I will refill today but  cannot after this point as Luis Eduardo has not had surgical intervention with our team in the last 6 months and therefore further refills should come from PCP.     We will discuss MRI results after MRis have been obtained and next steps. In the event that patient's symptoms worsen or change we would like to see him sooner. We also discussed signs of a worsening problem that he should seek being evaluated.     Respectfully,     Lizbeth Beck PA-C  [facility] [place] [address]    Tel [phone]        Again, thank you for allowing me to participate in the care of your patient.        Sincerely,        Lizbeth Beck PA-C

## 2022-03-08 ENCOUNTER — HOSPITAL ENCOUNTER (OUTPATIENT)
Dept: MRI IMAGING | Facility: CLINIC | Age: 47
Discharge: HOME | End: 2022-03-08
Attending: PHYSICIAN ASSISTANT | Admitting: PHYSICIAN ASSISTANT
Payer: COMMERCIAL

## 2022-03-08 ENCOUNTER — MYC REFILL (OUTPATIENT)
Dept: FAMILY MEDICINE | Facility: CLINIC | Age: 47
End: 2022-03-08
Payer: COMMERCIAL

## 2022-03-08 DIAGNOSIS — Z98.1 S/P LUMBAR SPINAL FUSION: ICD-10-CM

## 2022-03-08 DIAGNOSIS — Z98.1 S/P LUMBAR FUSION: ICD-10-CM

## 2022-03-08 PROCEDURE — 72148 MRI LUMBAR SPINE W/O DYE: CPT

## 2022-03-08 PROCEDURE — 72146 MRI CHEST SPINE W/O DYE: CPT

## 2022-03-08 RX ORDER — HYDROCODONE BITARTRATE AND ACETAMINOPHEN 5; 325 MG/1; MG/1
1-2 TABLET ORAL EVERY 6 HOURS PRN
Qty: 20 TABLET | Refills: 0 | Status: CANCELLED | OUTPATIENT
Start: 2022-03-08

## 2022-03-08 NOTE — TELEPHONE ENCOUNTER
Refill request for norco. Lizbeth Beck PA-C  gave a 1 time refill and recommended further refills should come from PCP. The patient verbalized understanding.     He is having his MRIs today. Will send a staff reminder to check for final reports.

## 2022-03-10 ENCOUNTER — TELEPHONE (OUTPATIENT)
Dept: NEUROSURGERY | Facility: CLINIC | Age: 47
End: 2022-03-10
Payer: COMMERCIAL

## 2022-03-10 DIAGNOSIS — S22.000A THORACIC COMPRESSION FRACTURE (H): Primary | ICD-10-CM

## 2022-03-10 NOTE — CONFIDENTIAL NOTE
imaging reviewed with patient     Luis Eduardo admits his pain has been improving since seen and is now a consistent 2/10. He has been following light activity restrictions and believes they are helping as well    We will plan for follow up in 6 and 12 weeks with thoracic XR prior to ensure stability of fracture. Recommend continuing light activities as well     Luis Eduardo in agreement with plans

## 2022-04-21 ENCOUNTER — OFFICE VISIT (OUTPATIENT)
Dept: NEUROSURGERY | Facility: CLINIC | Age: 47
End: 2022-04-21
Attending: PHYSICIAN ASSISTANT
Payer: COMMERCIAL

## 2022-04-21 ENCOUNTER — ANCILLARY PROCEDURE (OUTPATIENT)
Dept: GENERAL RADIOLOGY | Facility: CLINIC | Age: 47
End: 2022-04-21
Attending: PHYSICIAN ASSISTANT
Payer: COMMERCIAL

## 2022-04-21 ENCOUNTER — TELEPHONE (OUTPATIENT)
Dept: NEUROSURGERY | Facility: CLINIC | Age: 47
End: 2022-04-21

## 2022-04-21 VITALS
BODY MASS INDEX: 26.37 KG/M2 | DIASTOLIC BLOOD PRESSURE: 89 MMHG | WEIGHT: 168 LBS | HEART RATE: 97 BPM | SYSTOLIC BLOOD PRESSURE: 128 MMHG | OXYGEN SATURATION: 97 % | HEIGHT: 67 IN

## 2022-04-21 DIAGNOSIS — Z98.1 S/P LUMBAR SPINAL FUSION: ICD-10-CM

## 2022-04-21 DIAGNOSIS — S22.070D COMPRESSION FRACTURE OF T10 VERTEBRA WITH ROUTINE HEALING, SUBSEQUENT ENCOUNTER: Primary | ICD-10-CM

## 2022-04-21 DIAGNOSIS — S22.000A THORACIC COMPRESSION FRACTURE (H): ICD-10-CM

## 2022-04-21 DIAGNOSIS — Z98.1 S/P LUMBAR FUSION: ICD-10-CM

## 2022-04-21 PROCEDURE — G0463 HOSPITAL OUTPT CLINIC VISIT: HCPCS

## 2022-04-21 PROCEDURE — 99214 OFFICE O/P EST MOD 30 MIN: CPT | Performed by: PHYSICIAN ASSISTANT

## 2022-04-21 PROCEDURE — 72080 X-RAY EXAM THORACOLMB 2/> VW: CPT | Performed by: RADIOLOGY

## 2022-04-21 RX ORDER — CYCLOBENZAPRINE HCL 10 MG
5-10 TABLET ORAL 3 TIMES DAILY PRN
Qty: 30 TABLET | Refills: 0 | Status: SHIPPED | OUTPATIENT
Start: 2022-04-21 | End: 2023-02-08

## 2022-04-21 RX ORDER — HYDROCODONE BITARTRATE AND ACETAMINOPHEN 5; 325 MG/1; MG/1
1-2 TABLET ORAL EVERY 6 HOURS PRN
Qty: 20 TABLET | Refills: 0 | Status: SHIPPED | OUTPATIENT
Start: 2022-04-21 | End: 2022-06-02

## 2022-04-21 ASSESSMENT — PAIN SCALES - GENERAL: PAINLEVEL: MILD PAIN (2)

## 2022-04-21 NOTE — TELEPHONE ENCOUNTER
Called patient to ask his PT preference near to him, of note the patient does NOT want [facility] -      locations available where he lives.     Lizbeth Beck PA-C states that patient [place] surgery.     Attempted to reach out to patient, no answer. Left voice message for patient to call clinic back to further discuss.      When patient calls back, will place external PT order and draft letter. Question whether patient would like the letter via [employer] or scanned to email

## 2022-04-21 NOTE — PROGRESS NOTES
"Luis Eduardo Echevarria is a 47 year old male who presents for:  Chief Complaint   Patient presents with     RECHECK     Thoracic compression fx         Initial Vitals:  /89   Pulse 97   Ht 5' 7\" (1.702 m)   Wt 168 lb (76.2 kg)   SpO2 97%   BMI 26.31 kg/m   Estimated body mass index is 26.31 kg/m  as calculated from the following:    Height as of this encounter: 5' 7\" (1.702 m).    Weight as of this encounter: 168 lb (76.2 kg).. Body surface area is 1.9 meters squared. BP completed using cuff size: regular  Mild Pain (2)    Nursing Comments:     Marisa Estrada MA    "

## 2022-04-21 NOTE — PROGRESS NOTES
"NEUROSURGERY CLINIC PROGRESS NOTE    DATE OF VISIT: 4/21/2022    HPI:   Luis Eduardo Echevarria is a pleasant 47 year old male who presents to the clinic today for 6 week follow up T10 compression fracture. Since last seen, patient admits he has been feeling better. He will have 2/10 pain in his mid back with bending, twisting and lifting. He denies radiation of pain, paresthesias, weakness, bowel/bladder changes, recent falls or trauma. He has been participating in light activities and tolerating well. When he stands or sits for long periods of time, pain will increase. He is taking ibuprofen for pain control- his significant other is worried he is taking too much of this. He also notes he will have neck pain with certain movements- he was unable to participate in PT after referral was placed due to thoracic pain.     Current Outpatient Medications   Medication     cyclobenzaprine (FLEXERIL) 10 MG tablet     No current facility-administered medications for this visit.       No Known Allergies    Past Medical History:   Diagnosis Date     Spinal stenosis      Spondylolisthesis at L5-S1 level     grade 3       Review Of Systems     ROS: 10 point ROS neg other than the symptoms noted above in the HPI.      OBJECTIVE:    /89   Pulse 97   Ht 5' 7\" (1.702 m)   Wt 168 lb (76.2 kg)   SpO2 97%   BMI 26.31 kg/m      Imaging:    THORACIC LUMBAR SPINE TWO VIEWS 4/21/2022 1:45 PM      COMPARISON: Thoracic spine MRI 3/8/2022, lumbar spine MRI 3/8/2022     HISTORY: Thoracic compression fracture (H)                                                                      IMPRESSION: Mild anterior wedge compression fracture deformity of the  T10 vertebral body again noted without change from the comparison MRI.  Vertebral body heights and contours of the visualized portions of the  thoracolumbar spine are otherwise normal. Alignment throughout the  visualized portions of the spine is normal.    Radiographic Findings: Full " radiological report in chart. I personally reviewed the images with the patient today.    Exam:    Patient appears comfortable and in no apparent distress. Moving all extremities.  Gait is non-antalgic. Able to walk on toes and heels without difficulty   CN II-XII grossly intact, alert and appropriate with conversation and following  commands  Bilateral upper extremities with full strength including hand intrinsics and grasp.  Sensation intact throughout.  Bilateral lower extremities 5/5 strength including plantar and dorsiflexion.  DTRs 2/4 bilateral bicep, 1/4 bilateral tricep, 1/4 left patellar, 2/4 right patellar, 1/4 bilateral achilles     No TTP along spinous processes or paraspinous muscles.     ASSESSMENT:    1. Compression fracture of T10 vertebra with routine healing, subsequent encounter    2. S/P lumbar spinal fusion    3. S/P lumbar fusion        PLAN:  Luis Eduardo Echevarria is a pleasant 47 year old male who presents to the clinic today for 6 week follow up T10 compression fracture.     The patient has remained compliant with the activity restrictions which included not lifting anything greater than 5-10 lbs. Today we discussed increasing activity from 10 lbs by 2-5 lbs per week, but encouraged continuing to avoid excessive bending,  twisting, and turning at the waist and to avoid jostling and jarring activities. A referral was placed to physical therapy for additional strengthening and core muscle exercises.    Rx sent for norco and flexeril for pain management- reviewed last time can prescribe these as fracture should be healed by his next appointment and referred to pain management for comprehensive services.     Neck pain was addressed- if continues despite 4-6 weeks of physical therapy, we can place orders for cervical Mri as well.     Our nursing staff will provide letter for patient to take to airport with his updated surgical history per patient request.     Mr. Echevarria will return to the clinic in 6  weeks with repeat imaging.    The patient gave verbal understanding and is in agreement with the above plan. He will call or return to the clinic for any worsening or changes in symptoms.    Respectfully,     DONIS Holloway PA-C

## 2022-04-21 NOTE — LETTER
"    4/21/2022         RE: Luis Eduardo Echevarria  [address]  [place] [address]        Dear Colleague,    Thank you for referring your patient, Luis Eduardo Echevarria, to the [facility] NEUROSURGERY CLINIC [employer]. Please see a copy of my visit note below.    NEUROSURGERY CLINIC PROGRESS NOTE    DATE OF VISIT: 4/21/2022    HPI:   Luis Eduardo Echevarria is a pleasant 47 year old male who presents to the clinic today for 6 week follow up T10 compression fracture. Since last seen, patient admits he has been feeling better. He will have 2/10 pain in his mid back with bending, twisting and lifting. He denies radiation of pain, paresthesias, weakness, bowel/bladder changes, recent falls or trauma. He has been participating in light activities and tolerating well. When he stands or sits for long periods of time, pain will increase. He is taking ibuprofen for pain control- his significant other is worried he is taking too much of this. He also notes he will have neck pain with certain movements- he was unable to participate in PT after referral was placed due to thoracic pain.     Current Outpatient Medications   Medication     cyclobenzaprine (FLEXERIL) 10 MG tablet     No current facility-administered medications for this visit.       No Known Allergies    Past Medical History:   Diagnosis Date     Spinal stenosis      Spondylolisthesis at L5-S1 level     grade 3       Review Of Systems     ROS: 10 point ROS neg other than the symptoms noted above in the HPI.      OBJECTIVE:    /89   Pulse 97   Ht 5' 7\" (1.702 m)   Wt 168 lb (76.2 kg)   SpO2 97%   BMI 26.31 kg/m      Imaging:    THORACIC LUMBAR SPINE TWO VIEWS 4/21/2022 1:45 PM      COMPARISON: Thoracic spine MRI 3/8/2022, lumbar spine MRI 3/8/2022     HISTORY: Thoracic compression fracture (H)                                                                      IMPRESSION: Mild anterior wedge compression fracture deformity of the  T10 vertebral body " again noted without change from the comparison MRI.  Vertebral body heights and contours of the visualized portions of the  thoracolumbar spine are otherwise normal. Alignment throughout the  visualized portions of the spine is normal.    Radiographic Findings: Full radiological report in chart. I personally reviewed the images with the patient today.    Exam:    Patient appears comfortable and in no apparent distress. Moving all extremities.  Gait is non-antalgic. Able to walk on toes and heels without difficulty   CN II-XII grossly intact, alert and appropriate with conversation and following  commands  Bilateral upper extremities with full strength including hand intrinsics and grasp.  Sensation intact throughout.  Bilateral lower extremities 5/5 strength including plantar and dorsiflexion.  DTRs 2/4 bilateral bicep, 1/4 bilateral tricep, 1/4 left patellar, 2/4 right patellar, 1/4 bilateral achilles     No TTP along spinous processes or paraspinous muscles.     ASSESSMENT:    1. Compression fracture of T10 vertebra with routine healing, subsequent encounter    2. S/P lumbar spinal fusion    3. S/P lumbar fusion        PLAN:  Luis Eduardo Echevarria is a pleasant 47 year old male who presents to the clinic today for 6 week follow up T10 compression fracture.     The patient has remained compliant with the activity restrictions which included not lifting anything greater than 5-10 lbs. Today we discussed increasing activity from 10 lbs by 2-5 lbs per week, but encouraged continuing to avoid excessive bending,  twisting, and turning at the waist and to avoid jostling and jarring activities. A referral was placed to physical therapy for additional strengthening and core muscle exercises.    Rx sent for norco and flexeril for pain management- reviewed last time can prescribe these as fracture should be healed by his next appointment and referred to pain management for comprehensive services.     Neck pain was addressed- if  "continues despite 4-6 weeks of physical therapy, we can place orders for cervical Mri as well.     Our nursing staff will provide letter for patient to take to airport with his updated surgical history per patient request.     Mr. Echevarria will return to the clinic in 6 weeks with repeat imaging.    The patient gave verbal understanding and is in agreement with the above plan. He will call or return to the clinic for any worsening or changes in symptoms.    Respectfully,     DONIS Holloway, VENK[place]      Luis Eduardo Echevarria is a 47 year old male who presents for:  Chief Complaint   Patient presents with     RECHECK     Thoracic compression fx         Initial Vitals:  /89   Pulse 97   Ht 5' 7\" (1.702 m)   Wt 168 lb (76.2 kg)   SpO2 97%   BMI 26.31 kg/m   Estimated body mass index is 26.31 kg/m  as calculated from the following:    Height as of this encounter: 5' 7\" (1.702 m).    Weight as of this encounter: 168 lb (76.2 kg).. Body surface area is 1.9 meters squared. BP completed using cuff size: regular  Mild Pain (2)    Nursing Comments:     Marisa Estrada MA        Again, thank you for allowing me to participate in the care of your patient.        Sincerely,        Lizbeth Beck PA-C    "

## 2022-04-21 NOTE — TELEPHONE ENCOUNTER
----- Message from Lizbeth Beck PA-C sent at 4/21/2022  3:15 PM CDT -----  Can we please place external physical therapy referral for close to [place] (Patient does NOT want [facility]). Please have external PT call patient as well.   Diagnosis : neck pain and thoracic compression fracture     Thank you     Lizbeth

## 2022-04-21 NOTE — TELEPHONE ENCOUNTER
LVM for Luis Eduardo to call us back to advise where he'd like to schedule PT (close to [place]).

## 2022-06-02 ENCOUNTER — OFFICE VISIT (OUTPATIENT)
Dept: NEUROSURGERY | Facility: CLINIC | Age: 47
End: 2022-06-02
Attending: PHYSICIAN ASSISTANT
Payer: COMMERCIAL

## 2022-06-02 ENCOUNTER — ANCILLARY PROCEDURE (OUTPATIENT)
Dept: GENERAL RADIOLOGY | Facility: CLINIC | Age: 47
End: 2022-06-02
Attending: PHYSICIAN ASSISTANT
Payer: COMMERCIAL

## 2022-06-02 VITALS
OXYGEN SATURATION: 99 % | HEART RATE: 92 BPM | SYSTOLIC BLOOD PRESSURE: 124 MMHG | DIASTOLIC BLOOD PRESSURE: 86 MMHG | HEIGHT: 67 IN | WEIGHT: 168 LBS | BODY MASS INDEX: 26.37 KG/M2

## 2022-06-02 DIAGNOSIS — S22.000A THORACIC COMPRESSION FRACTURE (H): ICD-10-CM

## 2022-06-02 DIAGNOSIS — S22.070D COMPRESSION FRACTURE OF T10 VERTEBRA WITH ROUTINE HEALING, SUBSEQUENT ENCOUNTER: Primary | ICD-10-CM

## 2022-06-02 PROCEDURE — 99212 OFFICE O/P EST SF 10 MIN: CPT | Performed by: PHYSICIAN ASSISTANT

## 2022-06-02 PROCEDURE — 72080 X-RAY EXAM THORACOLMB 2/> VW: CPT | Mod: TC | Performed by: RADIOLOGY

## 2022-06-02 PROCEDURE — G0463 HOSPITAL OUTPT CLINIC VISIT: HCPCS

## 2022-06-02 ASSESSMENT — PAIN SCALES - GENERAL: PAINLEVEL: MILD PAIN (2)

## 2022-06-02 NOTE — PROGRESS NOTES
"Luis Eduardo Echevarria is a 47 year old male who presents for:  Chief Complaint   Patient presents with     RECHECK     12 wk F/U for thoracic compression fx; Xr review           Initial Vitals:  /86   Pulse 92   Ht 5' 7\" (1.702 m)   Wt 168 lb (76.2 kg)   SpO2 99%   BMI 26.31 kg/m   Estimated body mass index is 26.31 kg/m  as calculated from the following:    Height as of this encounter: 5' 7\" (1.702 m).    Weight as of this encounter: 168 lb (76.2 kg).. Body surface area is 1.9 meters squared. BP completed using cuff size: large  Mild Pain (2)    Nursing Comments:     Marisa Estrada MA  "

## 2022-06-02 NOTE — PROGRESS NOTES
"NEUROSURGERY CLINIC PROGRESS NOTE    DATE OF VISIT: 6/2/2022    HPI:   Luis Eduardo Echevarria is a pleasant 47 year old male who presents to the clinic today for 12 week follow up T10 compression fracture. Since last seen, Luis Eduardo has been improving. He notes his pain has improved since last seen. He will occasionally get mid back pain when standing for long periods of time. He has been gradually increasing his activities and tolerating well. He is now lifting his son and tolerating this. Per significant other, he also put together a [employer] gym set without issues. He deferred physical therapy as he has been tolerating gradual increase in activities himself. He has been walking and running with his son as well. He feels stronger when compared to last visit. He will take muscle relaxant and ibuprofen each night for pain. His significant other is hopeful he can start cutting back on ibuprofen. Pain management referral was offered at last visit but patient felt that he was tolerating pain at home so deferred this. He denies radicular symptoms, paresthesias, new weakness, bowel/bladder changes, gait changes.     Current Outpatient Medications   Medication     cyclobenzaprine (FLEXERIL) 10 MG tablet     No current facility-administered medications for this visit.       No Known Allergies    Past Medical History:   Diagnosis Date     Spinal stenosis      Spondylolisthesis at L5-S1 level     grade 3       Review Of Systems   ROS: 10 point ROS neg other than the symptoms noted above in the HPI.      OBJECTIVE:    /86   Pulse 92   Ht 5' 7\" (1.702 m)   Wt 168 lb (76.2 kg)   SpO2 99%   BMI 26.31 kg/m      Imaging:    THORACIC LUMBAR SPINE TWO VIEWS    6/2/2022 1:15 PM      HISTORY: Thoracic compression fracture.     COMPARISON: Spine x-ray 4/21/2022. Thoracic spine MR 3/8/2022.                                                                       IMPRESSION: Mild compression deformity with anterior wedging of the  T10 " "vertebral body appears similar in alignment to prior x-ray  4/21/2022. No obvious new loss of vertebral body height. Spinal fusion  hardware partially visualized lower lumbar spine.      PAMELLA KINCAID MD     Radiographic Findings: Full radiological report in chart. I personally reviewed the images with the patient today.    Exam:    Patient appears comfortable and in no apparent distress. Moving all extremities.  Gait is non-antalgic.  CN II-XII grossly intact, alert and appropriate with conversation and following  commands  Bilateral upper extremities with full strength including hand intrinsics and grasp.  Sensation intact throughout.  Bilateral lower extremities 5/5 strength including plantar and dorsiflexion.  Normal sensation throughout bilaterally.    No TTP along spinous processes or paraspinous muscles   +notes a \"tickle sensation\" along mid thoracic spinous processes    ASSESSMENT:    1. Compression fracture of T10 vertebra with routine healing, subsequent encounter        PLAN:  Luis Eduardo Echevarria is a pleasant 47 year old male who presents to the clinic today for 12 week follow up T10 compression fracture. Since last seen, Luis Eduardo has been improving. He notes his pain has improved since last seen. He will occaisonally get mid back pain when standing for long periods of time. He has been gradually increasing his activities and tolerating well. He is now lifting his son and tolerating this. Per significant other, he also put together a jungle gym set without issues. He deferred pysical therapy as he has been tolerating gradual increase in activities himself. He has been walking and running with his son as well. He feels stronger when compared to last visit. He will take muscle relaxant and ibuprofen each night for pain. His significant other is hopeful he can start cutting back on ibuprofen. Pain management referral was offered at last visit but patient felt that he was tolerating pain at home so deferred this. " He denies radicular symptoms, paresthesias, new weakness, bowel/bladder changes, gait changes.     Imaging was reviewed today and all questions were answered.    Reviewed with Luis Eduardo recommend cutting back on ibuprofen due to risks of continual use. Reviewed Pain management and Physical Therapy referrals are always an option and I am happy to place these at any times if he would like.     Reviewed with Luis Eduardo is cleared for all activities at this time from a neurosurgical standpoint. Reviewed red flag s/s and advised to call or present should these occur.     Mr. Echevarria will return to the clinic on an as needed basis.     The patient gave verbal understanding and is in agreement with the above plan. He will call or return to the clinic for any worsening or changes in symptoms.    Respectfully,     Lizbeth Beck PA-C  [facility] Neurosurgery  [address] [address]    Tel [phone]

## 2022-06-02 NOTE — LETTER
"    6/2/2022         RE: Luis Eduardo Echevarria  [address]  [place] [address]        Dear Colleague,    Thank you for referring your patient, Luis Eduardo Echevarria, to the [facility] NEUROSURGERY CLINIC [place]. Please see a copy of my visit note below.    NEUROSURGERY CLINIC PROGRESS NOTE    DATE OF VISIT: 6/2/2022    HPI:   Luis Eduardo Echevarria is a pleasant 47 year old male who presents to the clinic today for 12 week follow up T10 compression fracture. Since last seen, Luis Eduardo has been improving. He notes his pain has improved since last seen. He will occasionally get mid back pain when standing for long periods of time. He has been gradually increasing his activities and tolerating well. He is now lifting his son and tolerating this. Per significant other, he also put together a [employer] gym set without issues. He deferred physical therapy as he has been tolerating gradual increase in activities himself. He has been walking and running with his son as well. He feels stronger when compared to last visit. He will take muscle relaxant and ibuprofen each night for pain. His significant other is hopeful he can start cutting back on ibuprofen. Pain management referral was offered at last visit but patient felt that he was tolerating pain at home so deferred this. He denies radicular symptoms, paresthesias, new weakness, bowel/bladder changes, gait changes.     Current Outpatient Medications   Medication     cyclobenzaprine (FLEXERIL) 10 MG tablet     No current facility-administered medications for this visit.       No Known Allergies    Past Medical History:   Diagnosis Date     Spinal stenosis      Spondylolisthesis at L5-S1 level     grade 3       Review Of Systems   ROS: 10 point ROS neg other than the symptoms noted above in the HPI.      OBJECTIVE:    /86   Pulse 92   Ht 5' 7\" (1.702 m)   Wt 168 lb (76.2 kg)   SpO2 99%   BMI 26.31 kg/m      Imaging:    THORACIC LUMBAR SPINE TWO VIEWS    " "6/2/2022 1:15 PM      HISTORY: Thoracic compression fracture.     COMPARISON: Spine x-ray 4/21/2022. Thoracic spine MR 3/8/2022.                                                                       IMPRESSION: Mild compression deformity with anterior wedging of the  T10 vertebral body appears similar in alignment to prior x-ray  4/21/2022. No obvious new loss of vertebral body height. Spinal fusion  hardware partially visualized lower lumbar spine.      PAMELLA KINCAID MD     Radiographic Findings: Full radiological report in chart. I personally reviewed the images with the patient today.    Exam:    Patient appears comfortable and in no apparent distress. Moving all extremities.  Gait is non-antalgic.  CN II-XII grossly intact, alert and appropriate with conversation and following  commands  Bilateral upper extremities with full strength including hand intrinsics and grasp.  Sensation intact throughout.  Bilateral lower extremities 5/5 strength including plantar and dorsiflexion.  Normal sensation throughout bilaterally.    No TTP along spinous processes or paraspinous muscles   +notes a \"tickle sensation\" along mid thoracic spinous processes    ASSESSMENT:    1. Compression fracture of T10 vertebra with routine healing, subsequent encounter        PLAN:  Luis Eduardo Echevarria is a pleasant 47 year old male who presents to the clinic today for 12 week follow up T10 compression fracture. Since last seen, Luis Eduardo has been improving. He notes his pain has improved since last seen. He will occaisonally get mid back pain when standing for long periods of time. He has been gradually increasing his activities and tolerating well. He is now lifting his son and tolerating this. Per significant other, he also put together a jungle gym set without issues. He deferred pysical therapy as he has been tolerating gradual increase in activities himself. He has been walking and running with his son as well. He feels stronger when compared " "to last visit. He will take muscle relaxant and ibuprofen each night for pain. His significant other is hopeful he can start cutting back on ibuprofen. Pain management referral was offered at last visit but patient felt that he was tolerating pain at home so deferred this. He denies radicular symptoms, paresthesias, new weakness, bowel/bladder changes, gait changes.     Imaging was reviewed today and all questions were answered.    Reviewed with Luis Eduardo recommend cutting back on ibuprofen due to risks of continual use. Reviewed Pain management and Physical Therapy referrals are always an option and I am happy to place these at any times if he would like.     Reviewed with Luis Eduardo is cleared for all activities at this time from a neurosurgical standpoint. Reviewed red flag s/s and advised to call or present should these occur.     Mr. Echevarria will return to the clinic on an as needed basis.     The patient gave verbal understanding and is in agreement with the above plan. He will call or return to the clinic for any worsening or changes in symptoms.    Respectfully,     Lizbeth Beck PA-C  [facility] [place] [address]    Tel [phone]        Luis Eduardo Echevarria is a 47 year old male who presents for:  Chief Complaint   Patient presents with     RECHECK     12 wk F/U for thoracic compression fx; Xr review           Initial Vitals:  /86   Pulse 92   Ht 5' 7\" (1.702 m)   Wt 168 lb (76.2 kg)   SpO2 99%   BMI 26.31 kg/m   Estimated body mass index is 26.31 kg/m  as calculated from the following:    Height as of this encounter: 5' 7\" (1.702 m).    Weight as of this encounter: 168 lb (76.2 kg).. Body surface area is 1.9 meters squared. BP completed using cuff size: large  Mild Pain (2)    Nursing Comments:     Marisa Estrada MA      Again, thank you for allowing me to participate in the care of your patient.  "       Sincerely,        Lizbeth Beck PA-C

## 2022-10-15 ENCOUNTER — HEALTH MAINTENANCE LETTER (OUTPATIENT)
Age: 47
End: 2022-10-15

## 2023-02-04 ENCOUNTER — APPOINTMENT (OUTPATIENT)
Dept: GENERAL RADIOLOGY | Facility: CLINIC | Age: 48
End: 2023-02-04
Attending: EMERGENCY MEDICINE
Payer: COMMERCIAL

## 2023-02-04 ENCOUNTER — HOSPITAL ENCOUNTER (EMERGENCY)
Facility: CLINIC | Age: 48
Discharge: HOME | End: 2023-02-04
Attending: EMERGENCY MEDICINE | Admitting: EMERGENCY MEDICINE
Payer: COMMERCIAL

## 2023-02-04 VITALS
OXYGEN SATURATION: 97 % | SYSTOLIC BLOOD PRESSURE: 142 MMHG | RESPIRATION RATE: 20 BRPM | DIASTOLIC BLOOD PRESSURE: 88 MMHG | HEART RATE: 88 BPM | TEMPERATURE: 98.1 F

## 2023-02-04 DIAGNOSIS — S82.839A CLOSED FRACTURE OF DISTAL END OF FIBULA, UNSPECIFIED FRACTURE MORPHOLOGY, UNSPECIFIED LATERALITY, INITIAL ENCOUNTER: ICD-10-CM

## 2023-02-04 PROCEDURE — 250N000013 HC RX MED GY IP 250 OP 250 PS 637: Performed by: EMERGENCY MEDICINE

## 2023-02-04 PROCEDURE — 99284 EMERGENCY DEPT VISIT MOD MDM: CPT | Mod: 25

## 2023-02-04 PROCEDURE — 73610 X-RAY EXAM OF ANKLE: CPT | Mod: LT

## 2023-02-04 PROCEDURE — 250N000011 HC RX IP 250 OP 636: Performed by: EMERGENCY MEDICINE

## 2023-02-04 PROCEDURE — 27786 TREATMENT OF ANKLE FRACTURE: CPT | Mod: LT

## 2023-02-04 RX ORDER — HYDROCODONE BITARTRATE AND ACETAMINOPHEN 5; 325 MG/1; MG/1
1 TABLET ORAL EVERY 6 HOURS PRN
Qty: 10 TABLET | Refills: 0 | Status: SHIPPED | OUTPATIENT
Start: 2023-02-04 | End: 2023-02-08

## 2023-02-04 RX ORDER — FENTANYL CITRATE 50 UG/ML
100 INJECTION, SOLUTION INTRAMUSCULAR; INTRAVENOUS ONCE
Status: COMPLETED | OUTPATIENT
Start: 2023-02-04 | End: 2023-02-04

## 2023-02-04 RX ORDER — HYDROCODONE BITARTRATE AND ACETAMINOPHEN 5; 325 MG/1; MG/1
2 TABLET ORAL ONCE
Status: COMPLETED | OUTPATIENT
Start: 2023-02-04 | End: 2023-02-04

## 2023-02-04 RX ADMIN — NICOTINE POLACRILEX 2 MG: 2 GUM, CHEWING BUCCAL at 04:55

## 2023-02-04 RX ADMIN — FENTANYL CITRATE 100 MCG: 50 INJECTION, SOLUTION INTRAMUSCULAR; INTRAVENOUS at 05:25

## 2023-02-04 RX ADMIN — HYDROCODONE BITARTRATE AND ACETAMINOPHEN 2 TABLET: 5; 325 TABLET ORAL at 05:07

## 2023-02-04 ASSESSMENT — ACTIVITIES OF DAILY LIVING (ADL): ADLS_ACUITY_SCORE: 35

## 2023-02-04 NOTE — ED PROVIDER NOTES
History     Chief Complaint:  Fall       HPI   Luis Eduardo Echevarria is a 48 year old male who presents with left ankle injury.  Came in via EMS after falling from 2 steps onto his left ankle.  He admits to alcohol tox alcohol and marijuana use.  He denies any other injury.  He is unable to walk due to pain.    Independent Historian: EMS contributed to history           Allergies:  No Known Allergies     Medications:    HYDROcodone-acetaminophen (NORCO) 5-325 MG tablet  cyclobenzaprine (FLEXERIL) 10 MG tablet        Past Medical History:    Past Medical History:   Diagnosis Date     Spinal stenosis      Spondylolisthesis at L5-S1 level        Past Surgical History:    Past Surgical History:   Procedure Laterality Date     no surgeries       OPTICAL TRACKING SYSTEM FUSION SPINE POSTERIOR LUMBAR THREE+ LEVELS N/A 2/26/2020    Procedure: Lumbar 3-Sacral 1 posterior segmental instrumentation. Pelvic Instrumentation. Bilateral Lumbar 3-Sacral 1 transforaminal interbody fusion and Leone Muro osteotomies. Posterior arthrodesis Lumbar 3-Sacral 1;  Surgeon: Dimitri Eason MD;  Location: [facility] OR     [facility]          Family History:    family history includes No Known Problems in his father and mother.    Social History:   reports that he has been smoking cigarettes. He has been smoking an average of 1 pack per day. He has quit using smokeless tobacco. He reports current alcohol use. He reports current drug use. Drug: Marijuana.  PCP: No Ref-Primary, Physician     Physical Exam     Patient Vitals for the past 24 hrs:   BP Temp Temp src Pulse Resp SpO2   02/04/23 0432 (!) 153/99 98.1  F (36.7  C) Temporal 99 20 99 %        Physical Exam  Constitutional:  Oriented to person, place, and time.  Slurred speech mild intoxicated  HENT:   Head:    Normocephalic.  Atraumatic  Mouth/Throat:   Oropharynx is clear and moist.   Eyes:    EOM are normal. Pupils are equal, round, and reactive to light.   Neck:    Neck supple.    Cardiovascular:  Normal rate, regular rhythm and normal heart sounds.      Exam reveals no gallop and no friction rub.       No murmur heard.  Pulmonary/Chest:  Effort normal and breath sounds normal.      No respiratory distress. No wheezes. No rales.      No reproducible chest wall pain.  Abdominal:   Soft. No distension. No tenderness. No rebound and no guarding.   Musculoskeletal:  Normal range of motion.  No midline spinal tenderness.  Swelling and tenderness noted to his left lateral malleolus of his ankle.  Limited range of motion secondary to pain.  Nonweightbearing.  2+ distal pulses.  Neurological:   Alert and oriented to person, place, and time.  Mildly slurred speech and intoxicated.  Strength sensation are intact left lower extremity.          Moves all 4 extremities spontaneously    Skin:    No rash noted. No pallor.  No ecchymosis or abrasion      Emergency Department Course     Imaging:  XR Ankle Left G/E 3 Views   Final Result   IMPRESSION: Mildly displaced oblique fracture of the distal fibula with mild posterior and superior displacement of the distal fracture fragment. Soft tissue swelling about the ankle. No additional fracture.         Report per radiology    Laboratory:  Labs Ordered and Resulted from Time of ED Arrival to Time of ED Departure - No data to display     Procedures      Splint Placement     Splint was applied to the left ankle with Ortho-Glass.  Posterior splint and side slabs were applied with Ace wrap.  And after placement I checked and adjusted the fit to ensure proper positioning. Patient was more comfortable with splint in place. Sensation and circulation are intact after splint placement.        Emergency Department Course:     Splint was applied by myself.  Ex-wife is here to take ownership of him. He is discharged to her care.        Interventions:  Medications   nicotine (NICORETTE) gum 2 mg (2 mg Buccal Given 2/4/23 3644)   HYDROcodone-acetaminophen (NORCO) 5-325 MG  per tablet 2 tablet (2 tablets Oral Given 23 1863)   fentaNYL (PF) (SUBLIMAZE) injection 100 mcg (100 mcg Nasal Given 23 5869)        Independent Interpretation (X-rays, CTs, rhythm strip):  X-ray left ankle- distal fibula fracture with no dislocation agree with radiology interpretation.        Disposition:  The patient was discharged to home.     Impression & Plan        Medical Decision Makin-year-old male that came in with ankle injury.  Is an isolated injury with no other injury noted on exam.  This is a distal fibula fracture mildly displaced not requiring reduction.  Splinting was placed please see procedure note.  I discussed initially nonweightbearing ice elevate and to continue with ibuprofen.  He is told to follow-up with orthopedics to reevaluate weightbearing status.  He will be discharged home with pain medication for breakthrough pain.  He is told to return for extreme pain weakness numbness any new symptoms or concerns.  He is intoxicated but ex-wife is here as a sober ride and is appropriate for discharge.      Diagnosis:    ICD-10-CM    1. Closed fracture of distal end of fibula, unspecified fracture morphology, unspecified laterality, initial encounter  S82.839A            Discharge Medications:  New Prescriptions    HYDROCODONE-ACETAMINOPHEN (NORCO) 5-325 MG TABLET    Take 1 tablet by mouth every 6 hours as needed for severe pain (7-10)        2023   Viraj Tesfaye MD Shapin, Ryan P, MD  2337       Viraj Tesfaye MD  2337

## 2023-02-08 ENCOUNTER — OFFICE VISIT (OUTPATIENT)
Dept: FAMILY MEDICINE | Facility: CLINIC | Age: 48
End: 2023-02-08

## 2023-02-08 VITALS
SYSTOLIC BLOOD PRESSURE: 138 MMHG | BODY MASS INDEX: 26.68 KG/M2 | TEMPERATURE: 97.1 F | HEIGHT: 67 IN | WEIGHT: 170 LBS | RESPIRATION RATE: 20 BRPM | HEART RATE: 92 BPM | DIASTOLIC BLOOD PRESSURE: 86 MMHG

## 2023-02-08 DIAGNOSIS — Z71.89 ACP (ADVANCE CARE PLANNING): ICD-10-CM

## 2023-02-08 DIAGNOSIS — Z01.818 PRE-OP EXAM: Primary | ICD-10-CM

## 2023-02-08 DIAGNOSIS — Z76.89 HEALTH CARE HOME: ICD-10-CM

## 2023-02-08 DIAGNOSIS — S82.891A ANKLE FRACTURE, RIGHT, CLOSED, INITIAL ENCOUNTER: ICD-10-CM

## 2023-02-08 LAB
ERYTHROCYTE [DISTWIDTH] IN BLOOD BY AUTOMATED COUNT: 13.3 %
HCT VFR BLD AUTO: 43.5 % (ref 40–53)
HEMOGLOBIN: 14.5 G/DL (ref 13.3–17.7)
MCH RBC QN AUTO: 35.4 PG (ref 26–33)
MCHC RBC AUTO-ENTMCNC: 33.3 G/DL (ref 31–36)
MCV RBC AUTO: 106.2 FL (ref 78–100)
PLATELET COUNT - QUEST: 156 10^9/L (ref 150–375)
RBC # BLD AUTO: 4.1 10*12/L (ref 4.4–5.9)
WBC # BLD AUTO: 8.7 10*9/L (ref 4–11)

## 2023-02-08 PROCEDURE — 99204 OFFICE O/P NEW MOD 45 MIN: CPT | Performed by: PHYSICIAN ASSISTANT

## 2023-02-08 PROCEDURE — 36415 COLL VENOUS BLD VENIPUNCTURE: CPT | Performed by: PHYSICIAN ASSISTANT

## 2023-02-08 PROCEDURE — 85027 COMPLETE CBC AUTOMATED: CPT | Performed by: PHYSICIAN ASSISTANT

## 2023-02-08 NOTE — PROGRESS NOTES
[facility]  [address]  [address]  [facility] [address]  Phone: [phone]  Fax: [phone]  Primary Provider: Viraj Ayala  Pre-op Performing Provider: VIRAJ AYALA      PREOPERATIVE EVALUATION:  Today's date: 2/8/2023    Luis Eduardo Echevarria is a 48 year old male who presents for a preoperative evaluation.    Surgical Information:  Surgery/Procedure: R ankle fracture repair  Surgery Location: [facility]  Surgeon: Dr. Friedman  Surgery Date: 2/10/23  Time of Surgery: TBD  Where patient plans to recover: At home with family  Fax number for surgical facility:     Type of Anesthesia Anticipated: to be determined    Assessment & Plan     The proposed surgical procedure is considered INTERMEDIATE risk.    Health Care Home    ACP (advance care planning)    Pre-op exam-mild MCV elevation noted, suspect from alcohol intake. Pt has cut back on drinking recently. Normal Hb and Hct  Proceed with surgery at surgeon's discretion.    - Hemogram Platelet (BFP)  - VENOUS COLLECTION    Ankle fracture, right, closed, initial encounter      Risks and Recommendations:  The patient has the following additional risks and recommendations for perioperative complications:   - No identified additional risk factors other than previously addressed    Medication Instructions:  Patient is on no chronic medications    RECOMMENDATION:  APPROVAL GIVEN to proceed with proposed procedure, without further diagnostic evaluation.      Subjective     HPI related to upcoming procedure: Fall down stairs, R ankle fracture    1. No - Have you ever had a heart attack or stroke?  2. No - Have you ever had surgery on your heart or blood vessels, such as a stent, coronary (heart) bypass, or surgery on an artery in the head, neck, heart, or legs?  3. No - Do you have chest pain when you are physically active?  4. No - Do you have a history of heart failure?  5. No - Do you currently have a cold, bronchitis, or symptoms of other  respiratory (head and chest) infections?  6. No - Do you have a cough, shortness of breath, or wheezing?  7. No - Do you or anyone in your family have a history of blood clots?  8. No - Do you or anyone in your family have a serious bleeding problem, such as long-lasting bleeding after surgeries or cuts?  9. No - Have you ever had anemia or been told to take iron pills?  10. No - Have you had any abnormal blood loss such as black, tarry or bloody stools, or abnormal vaginal bleeding?  11. No - Have you ever had a blood transfusion?  12. Yes - Are you willing to have a blood transfusion if it is medically needed before, during, or after your surgery?  13. No - Have you or anyone in your family ever had problems with anesthesia (sedation for surgery)?  14. No - Do you have sleep apnea, excessive snoring, or daytime drowsiness?   15. No - Do you have any artifical heart valves or other implanted medical devices, such as a pacemaker, defibrillator, or continuous glucose monitor?  16. No - Do you have any artifical joints?  17. No - Are you allergic to latex?      Health Care Directive:  Patient does not have a Health Care Directive or Living Will: Discussed advance care planning with patient; however, patient declined at this time.    Preoperative Review of :   reviewed - controlled substances reflected in medication list.      Status of Chronic Conditions:  See problem list for active medical problems.  Problems all longstanding and stable, except as noted/documented.  See ROS for pertinent symptoms related to these conditions.    Review of Systems  CONSTITUTIONAL: NEGATIVE for fever, chills, change in weight  INTEGUMENTARY/SKIN: NEGATIVE for worrisome rashes, moles or lesions  EYES: NEGATIVE for vision changes or irritation  ENT/MOUTH: NEGATIVE for ear, mouth and throat problems  RESP: NEGATIVE for significant cough or SOB  CV: NEGATIVE for chest pain, palpitations or peripheral edema  GI: NEGATIVE for nausea,  "abdominal pain, heartburn, or change in bowel habits  : NEGATIVE for frequency, dysuria, or hematuria  NEURO: NEGATIVE for weakness, dizziness or paresthesias  ENDOCRINE: NEGATIVE for temperature intolerance, skin/hair changes  HEME: NEGATIVE for bleeding problems  PSYCHIATRIC: NEGATIVE for changes in mood or affect    Patient Active Problem List    Diagnosis Date Noted     Health Care Home 02/08/2023     Priority: Medium     ACP (advance care planning) 02/08/2023     Priority: Medium     History of lumbar fusion 02/26/2020     Priority: Medium     Isthmic spondylolisthesis 12/09/2019     Priority: Medium     Spinal stenosis of lumbar region with radiculopathy 12/09/2019     Priority: Medium      Past Medical History:   Diagnosis Date     Spinal stenosis      Spondylolisthesis at L5-S1 level     grade 3     Past Surgical History:   Procedure Laterality Date     no surgeries       OPTICAL TRACKING SYSTEM FUSION SPINE POSTERIOR LUMBAR THREE+ LEVELS N/A 2/26/2020    Procedure: Lumbar 3-Sacral 1 posterior segmental instrumentation. Pelvic Instrumentation. Bilateral Lumbar 3-Sacral 1 transforaminal interbody fusion and Leone Muro osteotomies. Posterior arthrodesis Lumbar 3-Sacral 1;  Surgeon: Dimitri Eason MD;  Location:  OR     [facility]       No current outpatient medications on file.       No Known Allergies     Social History     Tobacco Use     Smoking status: Every Day     Packs/day: 1.00     Types: Cigarettes     Smokeless tobacco: Former   Substance Use Topics     Alcohol use: Yes     Comment: 2x week       History   Drug Use     Types: Marijuana     Comment: occ         Objective     /86 (BP Location: Right arm, Patient Position: Chair, Cuff Size: Adult Large)   Pulse 92   Temp 97.1  F (36.2  C) (Temporal)   Resp 20   Ht 1.702 m (5' 7\")   Wt 77.1 kg (170 lb)   BMI 26.63 kg/m      Physical Exam    GENERAL APPEARANCE: healthy, alert and no distress     EYES: EOMI,  PERRL     HENT: ear " canals and TM's normal and nose and mouth without ulcers or lesions     NECK: no adenopathy, no asymmetry, masses, or scars and thyroid normal to palpation     RESP: lungs clear to auscultation - no rales, rhonchi or wheezes     CV: regular rates and rhythm, normal S1 S2, no S3 or S4 and no murmur, click or rub     ABDOMEN:  soft, nontender, no HSM or masses and bowel sounds normal     SKIN: no suspicious lesions or rashes     NEURO: Normal strength and tone, sensory exam grossly normal, mentation intact and speech normal     PSYCH: mentation appears normal. and affect normal/bright     LYMPHATICS: No cervical adenopathy    Recent Labs   Lab Test 04/01/21  0337   HGB 14.9         POTASSIUM 3.1*   CR 0.95        Diagnostics:  Recent Results (from the past 24 hour(s))   Hemogram Platelet (BFP)    Collection Time: 02/08/23  1:29 PM   Result Value Ref Range    WBC 8.7 4.0 - 11 10*9/L    RBC Count 4.10 (A) 4.4 - 5.9 10*12/L    Hemoglobin 14.5 13.3 - 17.7 g/dL    Hematocrit 43.5 40.0 - 53.0 %    .2 (A) 78 - 100 fL    MCH 35.4 (A) 26 - 33 pg    MCHC 33.3 31 - 36 g/dL    RDW 13.3 %    Platelet Count 156 150 - 375 10^9/L      No EKG required, no history of coronary heart disease, significant arrhythmia, peripheral arterial disease or other structural heart disease.    Revised Cardiac Risk Index (RCRI):  The patient has the following serious cardiovascular risks for perioperative complications:   - No serious cardiac risks = 0 points     RCRI Interpretation: 0 points: Class I (very low risk - 0.4% complication rate)         Signed Electronically by: Viraj Ayala PA-C  Copy of this evaluation report is provided to requesting physician.

## 2023-02-08 NOTE — NURSING NOTE
Dr RYLAND Friedman  Friday Bhavana Echevarria is here for a pre-op exam.    Questioned patient about current smoking habits.  Pt. currently smokes.  Advised about smoking cessation.  PULSE regular  My Chart: active  CLASSIFICATION OF OVERWEIGHT AND OBESITY BY BMI                        Obesity Class           BMI(kg/m2)  Underweight                                    < 18.5  Normal                                         18.5-24.9  Overweight                                     25.0-29.9  OBESITY                     I                  30.0-34.9                             II                 35.0-39.9  EXTREME OBESITY             III                >40                            Patient's  BMI Body mass index is 26.63 kg/m .  http://hin.nhlbi.nih.gov/menuplanner/menu.cgi  Pre-visit planning  Immunizations - up to date  Colonoscopy -   Mammogram -   Asthma -   PHQ9 -    DILLON-7 -

## 2023-02-08 NOTE — LETTER
[facility]  [address]  [address]  [facility] [address]  Phone: [phone]  Fax: [phone]  Primary Provider: Viraj Ayala  Pre-op Performing Provider: VIRAJ AYALA      PREOPERATIVE EVALUATION:  Today's date: 2/8/2023    Luis Eduardo Echevarria is a 48 year old male who presents for a preoperative evaluation.    Surgical Information:  Surgery/Procedure: R ankle fracture repair  Surgery Location: [facility]  Surgeon: Dr. Friedman  Surgery Date: 2/10/23  Time of Surgery: TBD  Where patient plans to recover: At home with family  Fax number for surgical facility:     Type of Anesthesia Anticipated: to be determined    Assessment & Plan     The proposed surgical procedure is considered INTERMEDIATE risk.    Health Care Home    ACP (advance care planning)    Pre-op exam-mild MCV elevation noted, suspect from alcohol intake. Pt has cut back on drinking recently. Normal Hb and Hct  Proceed with surgery at surgeon's discretion.    - Hemogram Platelet (BFP)  - VENOUS COLLECTION    Ankle fracture, right, closed, initial encounter      Risks and Recommendations:  The patient has the following additional risks and recommendations for perioperative complications:   - No identified additional risk factors other than previously addressed    Medication Instructions:  Patient is on no chronic medications    RECOMMENDATION:  APPROVAL GIVEN to proceed with proposed procedure, without further diagnostic evaluation.      Subjective     HPI related to upcoming procedure: Fall down stairs, R ankle fracture    1. No - Have you ever had a heart attack or stroke?  2. No - Have you ever had surgery on your heart or blood vessels, such as a stent, coronary (heart) bypass, or surgery on an artery in the head, neck, heart, or legs?  3. No - Do you have chest pain when you are physically active?  4. No - Do you have a history of heart failure?  5. No - Do you currently have a cold, bronchitis, or symptoms of other  respiratory (head and chest) infections?  6. No - Do you have a cough, shortness of breath, or wheezing?  7. No - Do you or anyone in your family have a history of blood clots?  8. No - Do you or anyone in your family have a serious bleeding problem, such as long-lasting bleeding after surgeries or cuts?  9. No - Have you ever had anemia or been told to take iron pills?  10. No - Have you had any abnormal blood loss such as black, tarry or bloody stools, or abnormal vaginal bleeding?  11. No - Have you ever had a blood transfusion?  12. Yes - Are you willing to have a blood transfusion if it is medically needed before, during, or after your surgery?  13. No - Have you or anyone in your family ever had problems with anesthesia (sedation for surgery)?  14. No - Do you have sleep apnea, excessive snoring, or daytime drowsiness?   15. No - Do you have any artifical heart valves or other implanted medical devices, such as a pacemaker, defibrillator, or continuous glucose monitor?  16. No - Do you have any artifical joints?  17. No - Are you allergic to latex?      Health Care Directive:  Patient does not have a Health Care Directive or Living Will: Discussed advance care planning with patient; however, patient declined at this time.    Preoperative Review of :   reviewed - controlled substances reflected in medication list.      Status of Chronic Conditions:  See problem list for active medical problems.  Problems all longstanding and stable, except as noted/documented.  See ROS for pertinent symptoms related to these conditions.    Review of Systems  CONSTITUTIONAL: NEGATIVE for fever, chills, change in weight  INTEGUMENTARY/SKIN: NEGATIVE for worrisome rashes, moles or lesions  EYES: NEGATIVE for vision changes or irritation  ENT/MOUTH: NEGATIVE for ear, mouth and throat problems  RESP: NEGATIVE for significant cough or SOB  CV: NEGATIVE for chest pain, palpitations or peripheral edema  GI: NEGATIVE for nausea,  "abdominal pain, heartburn, or change in bowel habits  : NEGATIVE for frequency, dysuria, or hematuria  NEURO: NEGATIVE for weakness, dizziness or paresthesias  ENDOCRINE: NEGATIVE for temperature intolerance, skin/hair changes  HEME: NEGATIVE for bleeding problems  PSYCHIATRIC: NEGATIVE for changes in mood or affect    Patient Active Problem List    Diagnosis Date Noted     Health Care Home 02/08/2023     Priority: Medium     ACP (advance care planning) 02/08/2023     Priority: Medium     History of lumbar fusion 02/26/2020     Priority: Medium     Isthmic spondylolisthesis 12/09/2019     Priority: Medium     Spinal stenosis of lumbar region with radiculopathy 12/09/2019     Priority: Medium      Past Medical History:   Diagnosis Date     Spinal stenosis      Spondylolisthesis at L5-S1 level     grade 3     Past Surgical History:   Procedure Laterality Date     no surgeries       OPTICAL TRACKING SYSTEM FUSION SPINE POSTERIOR LUMBAR THREE+ LEVELS N/A 2/26/2020    Procedure: Lumbar 3-Sacral 1 posterior segmental instrumentation. Pelvic Instrumentation. Bilateral Lumbar 3-Sacral 1 transforaminal interbody fusion and Leone Muro osteotomies. Posterior arthrodesis Lumbar 3-Sacral 1;  Surgeon: Dimitri Eason MD;  Location:  OR     [facility]       No current outpatient medications on file.       No Known Allergies     Social History     Tobacco Use     Smoking status: Every Day     Packs/day: 1.00     Types: Cigarettes     Smokeless tobacco: Former   Substance Use Topics     Alcohol use: Yes     Comment: 2x week       History   Drug Use     Types: Marijuana     Comment: occ         Objective     /86 (BP Location: Right arm, Patient Position: Chair, Cuff Size: Adult Large)   Pulse 92   Temp 97.1  F (36.2  C) (Temporal)   Resp 20   Ht 1.702 m (5' 7\")   Wt 77.1 kg (170 lb)   BMI 26.63 kg/m      Physical Exam    GENERAL APPEARANCE: healthy, alert and no distress     EYES: EOMI,  PERRL     HENT: ear " canals and TM's normal and nose and mouth without ulcers or lesions     NECK: no adenopathy, no asymmetry, masses, or scars and thyroid normal to palpation     RESP: lungs clear to auscultation - no rales, rhonchi or wheezes     CV: regular rates and rhythm, normal S1 S2, no S3 or S4 and no murmur, click or rub     ABDOMEN:  soft, nontender, no HSM or masses and bowel sounds normal     SKIN: no suspicious lesions or rashes     NEURO: Normal strength and tone, sensory exam grossly normal, mentation intact and speech normal     PSYCH: mentation appears normal. and affect normal/bright     LYMPHATICS: No cervical adenopathy    Recent Labs   Lab Test 04/01/21  0337   HGB 14.9         POTASSIUM 3.1*   CR 0.95        Diagnostics:  Recent Results (from the past 24 hour(s))   Hemogram Platelet (BFP)    Collection Time: 02/08/23  1:29 PM   Result Value Ref Range    WBC 8.7 4.0 - 11 10*9/L    RBC Count 4.10 (A) 4.4 - 5.9 10*12/L    Hemoglobin 14.5 13.3 - 17.7 g/dL    Hematocrit 43.5 40.0 - 53.0 %    .2 (A) 78 - 100 fL    MCH 35.4 (A) 26 - 33 pg    MCHC 33.3 31 - 36 g/dL    RDW 13.3 %    Platelet Count 156 150 - 375 10^9/L      No EKG required, no history of coronary heart disease, significant arrhythmia, peripheral arterial disease or other structural heart disease.    Revised Cardiac Risk Index (RCRI):  The patient has the following serious cardiovascular risks for perioperative complications:   - No serious cardiac risks = 0 points     RCRI Interpretation: 0 points: Class I (very low risk - 0.4% complication rate)         Signed Electronically by: Viraj Ayala PA-C  Copy of this evaluation report is provided to requesting physician.

## 2023-03-25 ENCOUNTER — HEALTH MAINTENANCE LETTER (OUTPATIENT)
Age: 48
End: 2023-03-25

## 2024-05-26 ENCOUNTER — HEALTH MAINTENANCE LETTER (OUTPATIENT)
Age: 49
End: 2024-05-26

## 2024-06-17 PROBLEM — Z76.89 HEALTH CARE HOME: Status: RESOLVED | Noted: 2023-02-08 | Resolved: 2024-06-17

## 2025-06-14 ENCOUNTER — HEALTH MAINTENANCE LETTER (OUTPATIENT)
Age: 50
End: 2025-06-14

## (undated) DEVICE — DRAPE O ARM TUBE 9732722

## (undated) DEVICE — MIDAS REX DISSECTING TOOL  14MH30

## (undated) DEVICE — DRAPE C-ARM 60X42" 1013

## (undated) DEVICE — SU VICRYL 0 UR-6 27" J603H

## (undated) DEVICE — SU VICRYL 0 CT-1 CR 8X18" J740D

## (undated) DEVICE — KIT PATIENT JACKSON 1 SPK10118

## (undated) DEVICE — DRAPE VARI-LENS II MICROSCOPE 52X150" 6120VL2

## (undated) DEVICE — GLOVE PROTEXIS BLUE W/NEU-THERA 8.5  2D73EB85

## (undated) DEVICE — NDL BLUNT 15GA 1.5"

## (undated) DEVICE — ESU GROUND PAD ADULT W/CORD E7507

## (undated) DEVICE — SPONGE COTTONOID 1X3" 80-1408

## (undated) DEVICE — SU PROLENE 6-0 BV-1DA 18" 8709H

## (undated) DEVICE — Device

## (undated) DEVICE — SPONGE COTTONOID 1/2X1 1/2" 1-STRING 80-1404

## (undated) DEVICE — GLOVE PROTEXIS MICRO 7.5  2D73PM75

## (undated) DEVICE — SU MONOCRYL 4-0 PS-2 18" UND Y496G

## (undated) DEVICE — CUSHION INSERT LG PRONE VIEW JACKSON TABLE

## (undated) DEVICE — SOL WATER IRRIG 1000ML BOTTLE 2F7114

## (undated) DEVICE — PACK LARGE SPINE SNE15LSFSE

## (undated) DEVICE — RX SURGIFLO HEMOSTATIC MATRIX W/THROMBIN 8ML 2994

## (undated) DEVICE — PREP CHLORAPREP 26ML TINTED ORANGE  260815

## (undated) DEVICE — SPONGE RAY-TEC 4X8" 7318

## (undated) DEVICE — SU ETHILON 3-0 FS-1 18" 669H

## (undated) DEVICE — CATH TRAY FOLEY SURESTEP 16FR W/URINE MTR STATLK LF A303416A

## (undated) DEVICE — ESU PENCIL W/HOLSTER E2350H

## (undated) DEVICE — SUCTION FRAZIER 12FR W/HANDLE K73

## (undated) DEVICE — MARKER SPHERES PASSIVE MEDT PACK 5 8801075

## (undated) DEVICE — LINEN TOWEL PACK X5 5464

## (undated) DEVICE — SPONGE COTTONOID 3/4X3/4" 80-1401

## (undated) DEVICE — SU VICRYL 2-0 CT-1 18' J739D

## (undated) DEVICE — SPONGE SURGIFOAM 100 1974

## (undated) DEVICE — WIPES FOLEY CARE SURESTEP PROVON DFC100

## (undated) DEVICE — MANIFOLD NEPTUNE 4 PORT 700-20

## (undated) DEVICE — BLADE BONE MILL STRK 3.2MM FINE 5400-702-000

## (undated) RX ORDER — DEXAMETHASONE SODIUM PHOSPHATE 4 MG/ML
INJECTION, SOLUTION INTRA-ARTICULAR; INTRALESIONAL; INTRAMUSCULAR; INTRAVENOUS; SOFT TISSUE
Status: DISPENSED
Start: 2020-02-26

## (undated) RX ORDER — LIDOCAINE HYDROCHLORIDE 20 MG/ML
INJECTION, SOLUTION EPIDURAL; INFILTRATION; INTRACAUDAL; PERINEURAL
Status: DISPENSED
Start: 2020-02-26

## (undated) RX ORDER — CEFAZOLIN SODIUM 1 G/3ML
INJECTION, POWDER, FOR SOLUTION INTRAMUSCULAR; INTRAVENOUS
Status: DISPENSED
Start: 2020-02-26

## (undated) RX ORDER — HYDROMORPHONE HYDROCHLORIDE 1 MG/ML
INJECTION, SOLUTION INTRAMUSCULAR; INTRAVENOUS; SUBCUTANEOUS
Status: DISPENSED
Start: 2020-02-26

## (undated) RX ORDER — BUPIVACAINE HYDROCHLORIDE AND EPINEPHRINE 2.5; 5 MG/ML; UG/ML
INJECTION, SOLUTION EPIDURAL; INFILTRATION; INTRACAUDAL; PERINEURAL
Status: DISPENSED
Start: 2020-02-26

## (undated) RX ORDER — VANCOMYCIN HYDROCHLORIDE 1 G/20ML
INJECTION, POWDER, LYOPHILIZED, FOR SOLUTION INTRAVENOUS
Status: DISPENSED
Start: 2020-02-26

## (undated) RX ORDER — ONDANSETRON 2 MG/ML
INJECTION INTRAMUSCULAR; INTRAVENOUS
Status: DISPENSED
Start: 2020-02-26

## (undated) RX ORDER — FENTANYL CITRATE 50 UG/ML
INJECTION, SOLUTION INTRAMUSCULAR; INTRAVENOUS
Status: DISPENSED
Start: 2020-02-26

## (undated) RX ORDER — GLYCOPYRROLATE 0.2 MG/ML
INJECTION, SOLUTION INTRAMUSCULAR; INTRAVENOUS
Status: DISPENSED
Start: 2020-02-26

## (undated) RX ORDER — ALBUMIN, HUMAN INJ 5% 5 %
SOLUTION INTRAVENOUS
Status: DISPENSED
Start: 2020-02-26

## (undated) RX ORDER — ACETAMINOPHEN 325 MG/1
TABLET ORAL
Status: DISPENSED
Start: 2020-02-26

## (undated) RX ORDER — CEFAZOLIN SODIUM 2 G/100ML
INJECTION, SOLUTION INTRAVENOUS
Status: DISPENSED
Start: 2020-02-26

## (undated) RX ORDER — VECURONIUM BROMIDE 1 MG/ML
INJECTION, POWDER, LYOPHILIZED, FOR SOLUTION INTRAVENOUS
Status: DISPENSED
Start: 2020-02-26

## (undated) RX ORDER — GABAPENTIN 300 MG/1
CAPSULE ORAL
Status: DISPENSED
Start: 2020-02-26